# Patient Record
Sex: MALE | Race: BLACK OR AFRICAN AMERICAN | NOT HISPANIC OR LATINO | Employment: OTHER | URBAN - METROPOLITAN AREA
[De-identification: names, ages, dates, MRNs, and addresses within clinical notes are randomized per-mention and may not be internally consistent; named-entity substitution may affect disease eponyms.]

---

## 2022-12-20 ENCOUNTER — APPOINTMENT (OUTPATIENT)
Dept: GENERAL RADIOLOGY | Facility: HOSPITAL | Age: 32
DRG: 871 | End: 2022-12-20
Payer: MEDICARE

## 2022-12-20 PROCEDURE — 99285 EMERGENCY DEPT VISIT HI MDM: CPT

## 2022-12-20 PROCEDURE — U0004 COV-19 TEST NON-CDC HGH THRU: HCPCS | Performed by: EMERGENCY MEDICINE

## 2022-12-20 PROCEDURE — 87150 DNA/RNA AMPLIFIED PROBE: CPT | Performed by: EMERGENCY MEDICINE

## 2022-12-20 PROCEDURE — 36415 COLL VENOUS BLD VENIPUNCTURE: CPT | Performed by: EMERGENCY MEDICINE

## 2022-12-20 PROCEDURE — 87147 CULTURE TYPE IMMUNOLOGIC: CPT | Performed by: EMERGENCY MEDICINE

## 2022-12-20 PROCEDURE — 88312 SPECIAL STAINS GROUP 1: CPT | Performed by: EMERGENCY MEDICINE

## 2022-12-20 PROCEDURE — 85025 COMPLETE CBC W/AUTO DIFF WBC: CPT | Performed by: EMERGENCY MEDICINE

## 2022-12-20 PROCEDURE — 71046 X-RAY EXAM CHEST 2 VIEWS: CPT

## 2022-12-20 PROCEDURE — 84145 PROCALCITONIN (PCT): CPT | Performed by: EMERGENCY MEDICINE

## 2022-12-20 PROCEDURE — 80053 COMPREHEN METABOLIC PANEL: CPT

## 2022-12-20 PROCEDURE — 83605 ASSAY OF LACTIC ACID: CPT

## 2022-12-20 PROCEDURE — 87186 SC STD MICRODIL/AGAR DIL: CPT | Performed by: EMERGENCY MEDICINE

## 2022-12-20 PROCEDURE — 87804 INFLUENZA ASSAY W/OPTIC: CPT | Performed by: EMERGENCY MEDICINE

## 2022-12-20 RX ORDER — SODIUM CHLORIDE 0.9 % (FLUSH) 0.9 %
10 SYRINGE (ML) INJECTION AS NEEDED
Status: DISCONTINUED | OUTPATIENT
Start: 2022-12-20 | End: 2023-01-01 | Stop reason: HOSPADM

## 2022-12-21 ENCOUNTER — HOSPITAL ENCOUNTER (INPATIENT)
Facility: HOSPITAL | Age: 32
LOS: 11 days | Discharge: HOME | DRG: 871 | End: 2023-01-01
Attending: EMERGENCY MEDICINE | Admitting: INTERNAL MEDICINE
Payer: MEDICARE

## 2022-12-21 ENCOUNTER — APPOINTMENT (OUTPATIENT)
Dept: CARDIOLOGY | Facility: HOSPITAL | Age: 32
DRG: 871 | End: 2022-12-21
Payer: MEDICARE

## 2022-12-21 ENCOUNTER — APPOINTMENT (OUTPATIENT)
Dept: CT IMAGING | Facility: HOSPITAL | Age: 32
DRG: 871 | End: 2022-12-21
Payer: MEDICARE

## 2022-12-21 DIAGNOSIS — N18.6 ESRD (END STAGE RENAL DISEASE): ICD-10-CM

## 2022-12-21 DIAGNOSIS — A41.9 SEPSIS, DUE TO UNSPECIFIED ORGANISM, UNSPECIFIED WHETHER ACUTE ORGAN DYSFUNCTION PRESENT: Primary | ICD-10-CM

## 2022-12-21 DIAGNOSIS — R26.2 DIFFICULTY WALKING: ICD-10-CM

## 2022-12-21 DIAGNOSIS — R50.9 FEVER, UNSPECIFIED FEVER CAUSE: ICD-10-CM

## 2022-12-21 PROBLEM — I31.39 PERICARDIAL EFFUSION: Status: ACTIVE | Noted: 2022-12-21

## 2022-12-21 LAB
ALBUMIN SERPL-MCNC: 4.4 G/DL (ref 3.5–5.2)
ALBUMIN/GLOB SERPL: 1.1 G/DL
ALP SERPL-CCNC: 92 U/L (ref 39–117)
ALT SERPL W P-5'-P-CCNC: 11 U/L (ref 1–41)
ANION GAP SERPL CALCULATED.3IONS-SCNC: 15.3 MMOL/L (ref 5–15)
ANION GAP SERPL CALCULATED.3IONS-SCNC: 16.3 MMOL/L (ref 5–15)
AST SERPL-CCNC: 15 U/L (ref 1–40)
BACTERIA BLD CULT: ABNORMAL
BASOPHILS # BLD AUTO: 0.03 10*3/MM3 (ref 0–0.2)
BASOPHILS # BLD AUTO: 0.04 10*3/MM3 (ref 0–0.2)
BASOPHILS NFR BLD AUTO: 0.2 % (ref 0–1.5)
BASOPHILS NFR BLD AUTO: 0.3 % (ref 0–1.5)
BH CV ECHO MEAS - AO ROOT DIAM: 2.8 CM
BH CV ECHO MEAS - EDV(MOD-SP2): 137 ML
BH CV ECHO MEAS - EDV(MOD-SP4): 142 ML
BH CV ECHO MEAS - EF(MOD-BP): 48 %
BH CV ECHO MEAS - ESV(MOD-SP2): 72 ML
BH CV ECHO MEAS - ESV(MOD-SP4): 73 ML
BH CV ECHO MEAS - IVSD: 1.7 CM
BH CV ECHO MEAS - LA DIMENSION: 5.1 CM
BH CV ECHO MEAS - LAT PEAK E' VEL: 7.4 CM/SEC
BH CV ECHO MEAS - LVIDD: 4.9 CM
BH CV ECHO MEAS - LVIDS: 3.7 CM
BH CV ECHO MEAS - LVOT DIAM: 2 CM
BH CV ECHO MEAS - LVPWD: 2 CM
BH CV ECHO MEAS - MED PEAK E' VEL: 4.79 CM/SEC
BH CV ECHO MEAS - MV A MAX VEL: 53 CM/SEC
BH CV ECHO MEAS - MV DEC TIME: 180 MSEC
BH CV ECHO MEAS - MV E MAX VEL: 118 CM/SEC
BH CV ECHO MEAS - MV E/A: 2.2
BH CV ECHO MEAS - RAP SYSTOLE: 3 MMHG
BH CV ECHO MEAS - RVDD: 2.9 CM
BH CV ECHO MEAS - RVSP: 49 MMHG
BH CV ECHO MEAS - TR MAX PG: 46 MMHG
BH CV ECHO MEAS - TR MAX VEL: 338 CM/SEC
BH CV ECHO MEASUREMENTS AVERAGE E/E' RATIO: 19.36
BILIRUB SERPL-MCNC: 1.1 MG/DL (ref 0–1.2)
BUN SERPL-MCNC: 46 MG/DL (ref 6–20)
BUN SERPL-MCNC: 54 MG/DL (ref 6–20)
BUN/CREAT SERPL: 5.1 (ref 7–25)
BUN/CREAT SERPL: 5.3 (ref 7–25)
CALCIUM SPEC-SCNC: 7.9 MG/DL (ref 8.6–10.5)
CALCIUM SPEC-SCNC: 8.6 MG/DL (ref 8.6–10.5)
CHLORIDE SERPL-SCNC: 90 MMOL/L (ref 98–107)
CHLORIDE SERPL-SCNC: 92 MMOL/L (ref 98–107)
CO2 SERPL-SCNC: 20.7 MMOL/L (ref 22–29)
CO2 SERPL-SCNC: 23.7 MMOL/L (ref 22–29)
CREAT SERPL-MCNC: 10.25 MG/DL (ref 0.76–1.27)
CREAT SERPL-MCNC: 9.07 MG/DL (ref 0.76–1.27)
D-LACTATE SERPL-SCNC: 1.3 MMOL/L (ref 0.5–2)
D-LACTATE SERPL-SCNC: 2.6 MMOL/L (ref 0.5–2)
DEPRECATED RDW RBC AUTO: 63.1 FL (ref 37–54)
DEPRECATED RDW RBC AUTO: 64.3 FL (ref 37–54)
EGFRCR SERPLBLD CKD-EPI 2021: 6.2 ML/MIN/1.73
EGFRCR SERPLBLD CKD-EPI 2021: 7.2 ML/MIN/1.73
EOSINOPHIL # BLD AUTO: 0.01 10*3/MM3 (ref 0–0.4)
EOSINOPHIL # BLD AUTO: 0.01 10*3/MM3 (ref 0–0.4)
EOSINOPHIL NFR BLD AUTO: 0.1 % (ref 0.3–6.2)
EOSINOPHIL NFR BLD AUTO: 0.1 % (ref 0.3–6.2)
ERYTHROCYTE [DISTWIDTH] IN BLOOD BY AUTOMATED COUNT: 18.9 % (ref 12.3–15.4)
ERYTHROCYTE [DISTWIDTH] IN BLOOD BY AUTOMATED COUNT: 19.2 % (ref 12.3–15.4)
FLUAV AG NPH QL: NEGATIVE
FLUBV AG NPH QL IA: NEGATIVE
GLOBULIN UR ELPH-MCNC: 4 GM/DL
GLUCOSE SERPL-MCNC: 117 MG/DL (ref 65–99)
GLUCOSE SERPL-MCNC: 125 MG/DL (ref 65–99)
HCT VFR BLD AUTO: 31 % (ref 37.5–51)
HCT VFR BLD AUTO: 34.2 % (ref 37.5–51)
HGB BLD-MCNC: 10.7 G/DL (ref 13–17.7)
HGB BLD-MCNC: 9.8 G/DL (ref 13–17.7)
HOLD SPECIMEN: NORMAL
HOLD SPECIMEN: NORMAL
IMM GRANULOCYTES # BLD AUTO: 0.15 10*3/MM3 (ref 0–0.05)
IMM GRANULOCYTES # BLD AUTO: 0.22 10*3/MM3 (ref 0–0.05)
IMM GRANULOCYTES NFR BLD AUTO: 1 % (ref 0–0.5)
IMM GRANULOCYTES NFR BLD AUTO: 1.4 % (ref 0–0.5)
IVRT: 85 MSEC
LEFT ATRIUM VOLUME INDEX: 69 ML/M2
LYMPHOCYTES # BLD AUTO: 0.46 10*3/MM3 (ref 0.7–3.1)
LYMPHOCYTES # BLD AUTO: 0.69 10*3/MM3 (ref 0.7–3.1)
LYMPHOCYTES NFR BLD AUTO: 3 % (ref 19.6–45.3)
LYMPHOCYTES NFR BLD AUTO: 4.5 % (ref 19.6–45.3)
MAGNESIUM SERPL-MCNC: 2 MG/DL (ref 1.6–2.6)
MAXIMAL PREDICTED HEART RATE: 187 BPM
MCH RBC QN AUTO: 29.1 PG (ref 26.6–33)
MCH RBC QN AUTO: 29.1 PG (ref 26.6–33)
MCHC RBC AUTO-ENTMCNC: 31.3 G/DL (ref 31.5–35.7)
MCHC RBC AUTO-ENTMCNC: 31.6 G/DL (ref 31.5–35.7)
MCV RBC AUTO: 92 FL (ref 79–97)
MCV RBC AUTO: 92.9 FL (ref 79–97)
MONOCYTES # BLD AUTO: 1.76 10*3/MM3 (ref 0.1–0.9)
MONOCYTES # BLD AUTO: 2.84 10*3/MM3 (ref 0.1–0.9)
MONOCYTES NFR BLD AUTO: 11.6 % (ref 5–12)
MONOCYTES NFR BLD AUTO: 18.4 % (ref 5–12)
NEUTROPHILS NFR BLD AUTO: 11.64 10*3/MM3 (ref 1.7–7)
NEUTROPHILS NFR BLD AUTO: 12.72 10*3/MM3 (ref 1.7–7)
NEUTROPHILS NFR BLD AUTO: 75.4 % (ref 42.7–76)
NEUTROPHILS NFR BLD AUTO: 84 % (ref 42.7–76)
NRBC BLD AUTO-RTO: 0 /100 WBC (ref 0–0.2)
NRBC BLD AUTO-RTO: 0 /100 WBC (ref 0–0.2)
PLATELET # BLD AUTO: 141 10*3/MM3 (ref 140–450)
PLATELET # BLD AUTO: 164 10*3/MM3 (ref 140–450)
PMV BLD AUTO: 10.2 FL (ref 6–12)
PMV BLD AUTO: 9.7 FL (ref 6–12)
POTASSIUM SERPL-SCNC: 4.4 MMOL/L (ref 3.5–5.2)
POTASSIUM SERPL-SCNC: 4.5 MMOL/L (ref 3.5–5.2)
PROCALCITONIN SERPL-MCNC: 62.91 NG/ML (ref 0–0.25)
PROT SERPL-MCNC: 8.4 G/DL (ref 6–8.5)
QT INTERVAL: 386 MS
RBC # BLD AUTO: 3.37 10*6/MM3 (ref 4.14–5.8)
RBC # BLD AUTO: 3.68 10*6/MM3 (ref 4.14–5.8)
SARS-COV-2 RNA PNL SPEC NAA+PROBE: DETECTED
SODIUM SERPL-SCNC: 128 MMOL/L (ref 136–145)
SODIUM SERPL-SCNC: 130 MMOL/L (ref 136–145)
STRESS TARGET HR: 159 BPM
WBC NRBC COR # BLD: 15.14 10*3/MM3 (ref 3.4–10.8)
WBC NRBC COR # BLD: 15.43 10*3/MM3 (ref 3.4–10.8)
WHOLE BLOOD HOLD COAG: NORMAL
WHOLE BLOOD HOLD SPECIMEN: NORMAL

## 2022-12-21 PROCEDURE — 25010000002 VANCOMYCIN 5 G RECONSTITUTED SOLUTION: Performed by: EMERGENCY MEDICINE

## 2022-12-21 PROCEDURE — 80048 BASIC METABOLIC PNL TOTAL CA: CPT | Performed by: STUDENT IN AN ORGANIZED HEALTH CARE EDUCATION/TRAINING PROGRAM

## 2022-12-21 PROCEDURE — 87205 SMEAR GRAM STAIN: CPT | Performed by: STUDENT IN AN ORGANIZED HEALTH CARE EDUCATION/TRAINING PROGRAM

## 2022-12-21 PROCEDURE — 71250 CT THORAX DX C-: CPT

## 2022-12-21 PROCEDURE — 93306 TTE W/DOPPLER COMPLETE: CPT | Performed by: INTERNAL MEDICINE

## 2022-12-21 PROCEDURE — 94799 UNLISTED PULMONARY SVC/PX: CPT

## 2022-12-21 PROCEDURE — 85025 COMPLETE CBC W/AUTO DIFF WBC: CPT | Performed by: STUDENT IN AN ORGANIZED HEALTH CARE EDUCATION/TRAINING PROGRAM

## 2022-12-21 PROCEDURE — 25010000002 CEFEPIME PER 500 MG: Performed by: EMERGENCY MEDICINE

## 2022-12-21 PROCEDURE — 93005 ELECTROCARDIOGRAM TRACING: CPT | Performed by: STUDENT IN AN ORGANIZED HEALTH CARE EDUCATION/TRAINING PROGRAM

## 2022-12-21 PROCEDURE — 99223 1ST HOSP IP/OBS HIGH 75: CPT | Performed by: STUDENT IN AN ORGANIZED HEALTH CARE EDUCATION/TRAINING PROGRAM

## 2022-12-21 PROCEDURE — 99222 1ST HOSP IP/OBS MODERATE 55: CPT | Performed by: INTERNAL MEDICINE

## 2022-12-21 PROCEDURE — 93306 TTE W/DOPPLER COMPLETE: CPT

## 2022-12-21 PROCEDURE — 94660 CPAP INITIATION&MGMT: CPT

## 2022-12-21 PROCEDURE — 83735 ASSAY OF MAGNESIUM: CPT | Performed by: STUDENT IN AN ORGANIZED HEALTH CARE EDUCATION/TRAINING PROGRAM

## 2022-12-21 PROCEDURE — 87070 CULTURE OTHR SPECIMN AEROBIC: CPT | Performed by: STUDENT IN AN ORGANIZED HEALTH CARE EDUCATION/TRAINING PROGRAM

## 2022-12-21 PROCEDURE — 83605 ASSAY OF LACTIC ACID: CPT | Performed by: EMERGENCY MEDICINE

## 2022-12-21 RX ORDER — ACETAMINOPHEN 325 MG/1
975 TABLET ORAL ONCE
Status: DISCONTINUED | OUTPATIENT
Start: 2022-12-21 | End: 2022-12-21

## 2022-12-21 RX ORDER — NIFEDIPINE 20 MG/1
60 CAPSULE ORAL 2 TIMES DAILY
Status: ON HOLD | COMMUNITY
End: 2022-12-21

## 2022-12-21 RX ORDER — SODIUM CHLORIDE 0.9 % (FLUSH) 0.9 %
10 SYRINGE (ML) INJECTION EVERY 12 HOURS SCHEDULED
Status: DISCONTINUED | OUTPATIENT
Start: 2022-12-21 | End: 2023-01-01 | Stop reason: HOSPADM

## 2022-12-21 RX ORDER — ALUMINA, MAGNESIA, AND SIMETHICONE 2400; 2400; 240 MG/30ML; MG/30ML; MG/30ML
15 SUSPENSION ORAL EVERY 6 HOURS PRN
Status: DISCONTINUED | OUTPATIENT
Start: 2022-12-21 | End: 2023-01-01 | Stop reason: HOSPADM

## 2022-12-21 RX ORDER — ACETAMINOPHEN 325 MG/1
650 TABLET ORAL EVERY 6 HOURS PRN
Status: DISCONTINUED | OUTPATIENT
Start: 2022-12-21 | End: 2023-01-01 | Stop reason: HOSPADM

## 2022-12-21 RX ORDER — ERGOCALCIFEROL 1.25 MG/1
50000 CAPSULE ORAL WEEKLY
COMMUNITY
End: 2023-01-01 | Stop reason: HOSPADM

## 2022-12-21 RX ORDER — LOSARTAN POTASSIUM 100 MG/1
100 TABLET ORAL NIGHTLY
COMMUNITY

## 2022-12-21 RX ORDER — UREA 10 %
1 LOTION (ML) TOPICAL DAILY
COMMUNITY
End: 2023-01-01 | Stop reason: HOSPADM

## 2022-12-21 RX ORDER — CHOLECALCIFEROL (VITAMIN D3) 125 MCG
5 CAPSULE ORAL NIGHTLY PRN
Status: DISCONTINUED | OUTPATIENT
Start: 2022-12-21 | End: 2023-01-01 | Stop reason: HOSPADM

## 2022-12-21 RX ORDER — FOLIC ACID/VIT B COMPLEX AND C 0.8 MG
1 TABLET ORAL DAILY
COMMUNITY
Start: 2022-10-06

## 2022-12-21 RX ORDER — SODIUM CHLORIDE 9 MG/ML
40 INJECTION, SOLUTION INTRAVENOUS AS NEEDED
Status: DISCONTINUED | OUTPATIENT
Start: 2022-12-21 | End: 2023-01-01 | Stop reason: HOSPADM

## 2022-12-21 RX ORDER — NIFEDIPINE 60 MG/1
60 TABLET, EXTENDED RELEASE ORAL DAILY
COMMUNITY

## 2022-12-21 RX ORDER — NITROGLYCERIN 0.4 MG/1
0.4 TABLET SUBLINGUAL
Status: DISCONTINUED | OUTPATIENT
Start: 2022-12-21 | End: 2023-01-01 | Stop reason: HOSPADM

## 2022-12-21 RX ORDER — CEFEPIME 1 G/50ML
2 INJECTION, SOLUTION INTRAVENOUS ONCE
Status: COMPLETED | OUTPATIENT
Start: 2022-12-21 | End: 2022-12-21

## 2022-12-21 RX ORDER — CALCIUM ACETATE 667 MG/1
667 CAPSULE ORAL 3 TIMES DAILY
COMMUNITY
Start: 2022-10-06 | End: 2023-01-01 | Stop reason: HOSPADM

## 2022-12-21 RX ORDER — CARVEDILOL 12.5 MG/1
12.5 TABLET ORAL EVERY 12 HOURS SCHEDULED
Status: DISCONTINUED | OUTPATIENT
Start: 2022-12-21 | End: 2022-12-21

## 2022-12-21 RX ORDER — CEFEPIME 1 G/50ML
2 INJECTION, SOLUTION INTRAVENOUS EVERY 24 HOURS
Status: DISCONTINUED | OUTPATIENT
Start: 2022-12-22 | End: 2022-12-23

## 2022-12-21 RX ORDER — ACETAMINOPHEN 325 MG/1
975 TABLET ORAL ONCE
Status: COMPLETED | OUTPATIENT
Start: 2022-12-21 | End: 2022-12-21

## 2022-12-21 RX ORDER — HEPARIN SODIUM 5000 [USP'U]/ML
5000 INJECTION, SOLUTION INTRAVENOUS; SUBCUTANEOUS EVERY 12 HOURS SCHEDULED
Status: DISCONTINUED | OUTPATIENT
Start: 2022-12-21 | End: 2022-12-21

## 2022-12-21 RX ORDER — SODIUM CHLORIDE 0.9 % (FLUSH) 0.9 %
10 SYRINGE (ML) INJECTION AS NEEDED
Status: DISCONTINUED | OUTPATIENT
Start: 2022-12-21 | End: 2023-01-01 | Stop reason: HOSPADM

## 2022-12-21 RX ADMIN — ACETAMINOPHEN 650 MG: 325 TABLET ORAL at 09:45

## 2022-12-21 RX ADMIN — CEFEPIME 2 G: 1 INJECTION, SOLUTION INTRAVENOUS at 02:10

## 2022-12-21 RX ADMIN — ALUMINUM HYDROXIDE, MAGNESIUM HYDROXIDE, AND DIMETHICONE 15 ML: 400; 400; 40 SUSPENSION ORAL at 16:40

## 2022-12-21 RX ADMIN — Medication 10 ML: at 08:50

## 2022-12-21 RX ADMIN — SODIUM CHLORIDE 250 ML: 9 INJECTION, SOLUTION INTRAVENOUS at 03:51

## 2022-12-21 RX ADMIN — Medication 10 ML: at 21:46

## 2022-12-21 RX ADMIN — VANCOMYCIN HYDROCHLORIDE 1000 MG: 5 INJECTION, POWDER, LYOPHILIZED, FOR SOLUTION INTRAVENOUS at 02:42

## 2022-12-21 RX ADMIN — ACETAMINOPHEN 975 MG: 325 TABLET ORAL at 01:37

## 2022-12-21 NOTE — PROGRESS NOTES
"[facility] Clinical Pharmacy Services: Vancomycin Pharmacokinetic Initial Consult Note    Ghassan Browning is a 32 y.o. male who is on day 1 of pharmacy to dose vancomycin for Sepsis.    Consulting Provider: Piedad  Planned Duration of Therapy: 7 days (through ) per consult  Was Patient Receiving Prior to Admission/Consult?: No  Loading Dose Ordered or Given: 1000 mg on  at 0242  PK/PD Target: Dose by Levels  Imaging Reviewed?: Yes  Other Antimicrobials: Cefepime    Microbiology Data  MRSA PCR performed: No; Result: Not ordered due to excluded indication or presence of suspected abscess  Culture/Source:    Blood cx: in process    Vitals/Labs  Ht: 162.6 cm (64\"); Wt: 52.2 kg (115 lb 1.3 oz)  Temp (24hrs), Av.4 °F (38 °C), Min:98.8 °F (37.1 °C), Max:103.1 °F (39.5 °C)   Estimated Creatinine Clearance: 7.6 mL/min (A) (by C-G formula based on SCr of 10.25 mg/dL (H)).  Hemodialysis - [facility] schedule     Results from last 7 days   Lab Units 22  0524 22  2354   CREATININE mg/dL 10.25* 9.07*   WBC 10*3/mm3 15.43* 15.14*     Assessment/Plan:    Vancomycin Dose: Will continue to pulse dose at this time due to HD. Last HD session .  Will continue to follow for planned HD schedule (usual schedule [facility]).  Loading dose received this morning @0242.  Vanc Random ordered for  at AM labs  Patient has order for Basic Metabolic Panel  AM labs.    Pharmacy will follow patient's kidney function and will adjust doses and obtain levels as necessary. Thank you for involving pharmacy in this patient's care. Please contact pharmacy with any questions or concerns.                           Ammy Oneal [facility]  Clinical Pharmacist    "

## 2022-12-21 NOTE — PLAN OF CARE
Goal Outcome Evaluation:   Pt refusing to wear bipap while sleeping. Tylenol given for body aches. VSS.        Progress: no change

## 2022-12-21 NOTE — NURSING NOTE
Patient arrived from ER.  Admission completed.  Patient very sleepy upon arrival.  O2 95% on RA.  Tachypnea. Patient shown how to use call light, educated on falls, bed to lowest position, bedside table within reach, bed locked.  No further questions or concerns from patient at this time.

## 2022-12-21 NOTE — CONSULTS
Cardiology Consult Note  [facility] CARE UNIT          Patient Identification:  Ghassan Browning      [id number]  32 y.o.        male  1990       Date of Consultation:     Reason for Consultation: Pericardial effusion    PCP: Paulina Cullen MD  Primary cardiologist: None    History of Present Illness:     32-year-old -American male, end-stage renal disease, chronic hemodialysis, chart history of SVC syndrome with previous SVC stenting, hypertension.  The patient is admitted with fever, chills, sepsis, productive cough.  As part of his initial work-up he had a CT chest which showed large pericardial effusion and bilateral infiltrates.  Subsequent echo done this morning showed a large circumferential pericardial effusion, appears to be fluid, there is no evidence of tamponade or right ventricular compression.  The patient reports pain all over.  He denies chest pain.  He missed 1 dialysis session due to this illness.  He is otherwise somnolent and appears somewhat confused at baseline.    Past History:  Past Medical History:   Diagnosis Date   • Bronchitis    • Chronic pain    • Contusion, hip    • ESRD on dialysis (HCC)    • Headache    • History of brain shunt    • Hypertension    • Kidney disease    • Sleep apnea    • Wrist sprain      Past Surgical History:   Procedure Laterality Date   • CSF SHUNT Right      Allergies   Allergen Reactions   • Methylene Blue Anaphylaxis and Hives     Throat closes/ swelling    • Ace Inhibitors    • Latex    • Penicillins      Social History     Socioeconomic History   • Marital status: Single   Tobacco Use   • Smoking status: Former     Packs/day: 0.50     Types: Cigarettes     Quit date: 2022     Years since quittin.0   Vaping Use   • Vaping Use: Never used   Substance and Sexual Activity   • Alcohol use: Never   • Drug use: Never   • Sexual activity: Defer     History reviewed. No pertinent family  "history.  Medications:  Medications Prior to Admission   Medication Sig Dispense Refill Last Dose   • Calcium Acetate, Phos Binder, 667 MG capsule Take 3 capsules by mouth. With each meal   12/20/2022   • carvedilol (COREG) 12.5 MG tablet Take 1 tablet by mouth 2 (two) times a day.   12/20/2022   • cinacalcet (SENSIPAR) 60 MG tablet Take 1 tablet by mouth 2 (two) times a day.   12/20/2022   • LOSARTAN POTASSIUM PO Take 100 mg by mouth Every Night.   12/20/2022   • NIFEdipine (PROCARDIA) 20 MG capsule Take 60 mg by mouth 2 (Two) Times a Day.   12/20/2022     Current medications:  [START ON 12/22/2022] cefepime, 2 g, Intravenous, Q24H  sodium chloride, 10 mL, Intravenous, Q12H      Current IV drips:  Pharmacy to Dose Cefepime,   Pharmacy to dose vancomycin,         Review of Systems   Unable to perform ROS: mental status change         Physical exam:    BP 98/52 (BP Location: Left arm, Patient Position: Lying)   Pulse 83   Temp 97.6 °F (36.4 °C) (Oral)   Resp 24   Ht 162.6 cm (64\")   Wt 52.2 kg (115 lb 1.3 oz)   SpO2 96%   BMI 19.75 kg/m²  Body mass index is 19.75 kg/m².   Oxygen saturation   @FLOWAN(10::1)@ SpO2  Min: 90 %  Max: 100 %    General Appearance:   · well developed  · Frail, chronically ill-appearing  HENT:   · oropharynx moist  · lips not cyanotic  Neck:  · thyroid not enlarged  · supple  Respiratory:  · no respiratory distress  · normal breath sounds  · no rales  Cardiovascular:  · no jugular venous distention  · regular rhythm  · apical impulse normal  · S1 normal, S2 normal  · no S3, no S4   · no murmur  · no rub, no thrill  · carotid pulses normal; no bruit  · pedal pulses normal  · lower extremity edema: Trace  Gastrointestinal:   · bowel sounds normal  · non-tender  · no hepatomegaly, no splenomegaly  Musculoskeletal:  · no clubbing of fingers.   · normocephalic, head atraumatic  Skin:   · warm, dry  Neuro/Psychiatric:  · Somnolent, confused at baseline drifts to " sleep  ·     Cardiographics:     ECG  (personally reviewed) sinus rhythm, rate 91, nonspecific ST changes   Telemetry:  (personally reviewed)    ECHO: Reviewed  Results for orders placed during the hospital encounter of 12/21/22    Adult Transthoracic Echo Complete W/ Cont if Necessary Per Protocol    Interpretation Summary  •  Left ventricular ejection fraction appears to be 46 - 50%.  •  The left ventricular cavity is mildly dilated.  •  Left ventricular wall thickness is consistent with moderate to severe concentric hypertrophy.  •  Left ventricular diastolic function is consistent with (grade II w/high LAP) pseudonormalization.  •  Moderately reduced right ventricular systolic function noted.  •  The right ventricular cavity is mildly dilated.  •  Left atrial volume is moderately increased.  •  Moderate tricuspid valve regurgitation is present.  •  Estimated right ventricular systolic pressure from tricuspid regurgitation is moderately elevated (45-55 mmHg).  •  There is a large (>2cm) circumferential pericardial effusion.  •  There is no evidence of cardiac tamponade.       CATH:     CARDIOLITE:      Lab Review:       Results from last 7 days   Lab Units 12/21/22  0524 12/20/22  2354   WBC 10*3/mm3 15.43* 15.14*   HEMOGLOBIN g/dL 9.8* 10.7*   HEMATOCRIT % 31.0* 34.2*            Results from last 7 days   Lab Units 12/21/22  0524 12/20/22  2354   SODIUM mmol/L 128* 130*   BUN mg/dL 54* 46*   CREATININE mg/dL 10.25* 9.07*   GLUCOSE mg/dL 117* 125*      Estimated Creatinine Clearance: 7.6 mL/min (A) (by C-G formula based on SCr of 10.25 mg/dL (H)).         Invalid input(s): LDLCALC        No results found for: TSH   No results found for: HGBA1C        No results found for: DIGOXIN   No components found for: DDIMERQUAN     Imaging:   XR Chest 2 View    Result Date: 12/21/2022  PROCEDURE: XR CHEST 2 VW  COMPARISON: None.  INDICATIONS: 32-year-old male with fever, cough, shortness of breath.  FINDINGS:  Two views  were obtained.  Moderate to marked cardiomegaly is seen.  An endovascular stent is projected over the right paratracheal region.  There is a partially visualized ventriculoperitoneal () shunt (distal limb) coursing over the right base of neck, right thorax, and right upper quadrant of the abdomen.  Surgical clips are projected over the base of the neck and the superior mediastinum.  There is suspected scoliosis.  There are bilateral infiltrates.  Mild pulmonary edema is possible.  Infectious multifocal pneumonia would be in the differential diagnosis.  Minimal, if any, pleural effusion is seen.  No pneumothorax.  No pneumomediastinum.  There is slight asymmetry of the soft tissues at the level of the base of the neck with those on the right more prominent than seen contralaterally.  The finding may be related to the patient's scoliosis.  Please correlate clinically.      Impression:   Bilateral infiltrates are seen.  The findings may represent mild pulmonary edema with vascular congestion.  Pneumonia cannot be excluded.  There is moderate to marked cardiomegaly.  Please see above comments for further detail.     Please note that portions of this note were completed with a voice recognition program.  JOHNATHON ALVAREZ JR, MD       Electronically Signed and Approved By: JOHNATHON ALVAREZ JR, MD on 12/21/2022 at 1:59                  The ASCVD Risk score ([place] DK, et al., 2019) failed to calculate for the following reasons:    The 2019 ASCVD risk score is only valid for ages 40 to 79      Assessment:      Sepsis, due to unspecified organism, unspecified whether acute organ dysfunction present (HCC)    Pericardial effusion    ESRD (end stage renal disease) (HCC)      Initial cardiac assessment: 32-year-old -American male with multiple medical problems presents with fever, chills, productive cough, clinical evaluation consistent with sepsis.  CT chest showed large pericardial effusion which is verified on echo but  no evidence of tamponade      Recommendations:  1.  At this time primary treatment of sepsis per primary service, and reviewing his echocardiogram and clinical/hemodynamic parameters at this time there is no evidence of tamponade or an indication to sample or drain his pericardial effusion.  2.  Volume management per nephrology with dialysis as needed/indicated  3.  Call if additional questions arise                    Andry May MD  12/21/2022    12:09 EST

## 2022-12-21 NOTE — ED PROVIDER NOTES
Time: 1:51 AM EST  Chief Complaint:   Chief Complaint   Patient presents with   • Fever   • Generalized Body Aches   • Dizziness     History of Present Illness:    Patient is a 32 y.o. year old male who presents to the emergency department with complaints of fever, body aches, headache, productive cough (yellowish), congestion that began 2 days ago. Pt reports nausea, but denies vomiting. Pt denies any known sick contacts. Patient is a HD patient. Pt went to dialysis yesterday. Patient feels somewhat short of breath. Pt denies chest pain.       History provided by:  Patient   used: No            Patient Care Team  Primary Care Provider: Paulina Cullen MD    Past Medical History:     Allergies   Allergen Reactions   • Methylene Blue Anaphylaxis and Hives     Throat closes/ swelling    • Ace Inhibitors    • Latex    • Penicillins      Past Medical History:   Diagnosis Date   • Bronchitis    • Chronic pain    • Contusion, hip    • ESRD on dialysis (HCC)    • Headache    • History of brain shunt    • Hypertension    • Kidney disease    • Sleep apnea    • Wrist sprain      Past Surgical History:   Procedure Laterality Date   • CSF SHUNT Right      History reviewed. No pertinent family history.    Home Medications:  Prior to Admission medications    Medication Sig Start Date End Date Taking? Authorizing Provider   Calcium Acetate, Phos Binder, 667 MG capsule Take 3 capsules by mouth. With each meal 14   Elsi Morillo MD   carvedilol (COREG) 12.5 MG tablet Take 1 tablet by mouth 2 (two) times a day. 14   Elsi Morillo MD   cinacalcet (SENSIPAR) 60 MG tablet Take 1 tablet by mouth 2 (two) times a day. 14   Elsi Morillo MD        Social History:   Social History     Tobacco Use   • Smoking status: Former     Packs/day: 0.50     Types: Cigarettes     Quit date: 2022     Years since quittin.0   Vaping Use   • Vaping Use: Never used   Substance Use Topics   •  "Alcohol use: Never   • Drug use: Never         Review of Systems:  Review of Systems   Constitutional: Positive for fever. Negative for chills.   HENT: Positive for congestion. Negative for rhinorrhea and sore throat.    Eyes: Negative for pain and visual disturbance.   Respiratory: Positive for cough and shortness of breath. Negative for apnea and chest tightness.    Cardiovascular: Negative for chest pain and palpitations.   Gastrointestinal: Positive for nausea. Negative for abdominal pain, diarrhea and vomiting.   Genitourinary: Negative for difficulty urinating and dysuria.   Musculoskeletal: Positive for myalgias. Negative for joint swelling.   Skin: Negative for color change.   Neurological: Positive for headaches. Negative for seizures.   Psychiatric/Behavioral: Negative.    All other systems reviewed and are negative.       Physical Exam:  /70 (BP Location: Left arm, Patient Position: Lying)   Pulse 87   Temp 98.8 °F (37.1 °C) (Oral)   Resp 22   Ht 162.6 cm (64\")   Wt 52.2 kg (115 lb 1.3 oz)   SpO2 95%   BMI 19.75 kg/m²     Physical Exam  Vitals and nursing note reviewed.   Constitutional:       General: He is not in acute distress.     Appearance: Normal appearance. He is ill-appearing. He is not toxic-appearing.   HENT:      Head: Normocephalic and atraumatic.      Jaw: There is normal jaw occlusion.   Eyes:      General: Lids are normal.      Extraocular Movements: Extraocular movements intact.      Conjunctiva/sclera: Conjunctivae normal.      Pupils: Pupils are equal, round, and reactive to light.   Cardiovascular:      Rate and Rhythm: Regular rhythm. Tachycardia present.      Pulses: Normal pulses.      Heart sounds: Normal heart sounds.   Pulmonary:      Effort: Pulmonary effort is normal. No respiratory distress.      Breath sounds: Normal breath sounds. No wheezing or rhonchi.   Abdominal:      General: Abdomen is flat.      Palpations: Abdomen is soft.      Tenderness: There is no " abdominal tenderness. There is no guarding or rebound.   Musculoskeletal:         General: Normal range of motion.      Cervical back: Normal range of motion and neck supple.      Right lower leg: No edema.      Left lower leg: No edema.      Comments: RUE AV fistula   Skin:     General: Skin is warm and dry.   Neurological:      Mental Status: He is alert and oriented to person, place, and time. Mental status is at baseline.   Psychiatric:         Mood and Affect: Mood normal.                Medications in the Emergency Department:  Medications   sodium chloride 0.9 % flush 10 mL (has no administration in time range)   nitroglycerin (NITROSTAT) SL tablet 0.4 mg (has no administration in time range)   sodium chloride 0.9 % flush 10 mL (has no administration in time range)   sodium chloride 0.9 % flush 10 mL (has no administration in time range)   sodium chloride 0.9 % infusion 40 mL (has no administration in time range)   acetaminophen (TYLENOL) tablet 650 mg (has no administration in time range)   aluminum-magnesium hydroxide-simethicone (MAALOX MAX) 400-400-40 MG/5ML suspension 15 mL (has no administration in time range)   melatonin tablet 5 mg (has no administration in time range)   Pharmacy to Dose Cefepime (has no administration in time range)   Pharmacy to dose vancomycin (has no administration in time range)   cefepime (MAXIPIME) IVPB 2 g (premix) in D5 (has no administration in time range)   acetaminophen (TYLENOL) tablet 975 mg (975 mg Oral Given 12/21/22 0137)   cefepime (MAXIPIME) IVPB 2 g (premix) in D5 (0 g Intravenous Stopped 12/21/22 0242)   vancomycin 1000 mg/250 mL 0.9% NS IVPB ([facility]) (0 mg Intravenous Stopped 12/21/22 0348)   sodium chloride 0.9 % bolus 250 mL (0 mL Intravenous Stopped 12/21/22 9484)        Labs  Lab Results (last 24 hours)     Procedure Component Value Units Date/Time    Influenza Antigen, Rapid - Swab, Nasopharynx [[id number]]  (Normal) Collected: 12/20/22 0778    Specimen: Swab  from Nasopharynx Updated: 12/21/22 0025     Influenza A Ag, EIA Negative     Influenza B Ag, EIA Negative    COVID-19,APTIMA PANTHER(F[facility]IAN),[facility], NP/OP SWAB IN UTM/VTM/SALINE TRANSPORT MEDIA,24 HR TAT - Swab, Nasal Cavity [[id number]] Collected: 12/20/22 2339    Specimen: Swab from Nasal Cavity Updated: 12/21/22 0000    CBC & Differential [[id number]]  (Abnormal) Collected: 12/20/22 2354    Specimen: Blood Updated: 12/21/22 0005    Narrative:      The following orders were created for panel order CBC & Differential.  Procedure                               Abnormality         Status                     ---------                               -----------         ------                     CBC Auto Differential[[id number]]        Abnormal            Final result                 Please view results for these tests on the individual orders.    Comprehensive Metabolic Panel [[id number]]  (Abnormal) Collected: 12/20/22 2354    Specimen: Blood Updated: 12/21/22 0026     Glucose 125 mg/dL      BUN 46 mg/dL      Creatinine 9.07 mg/dL      Sodium 130 mmol/L      Potassium 4.5 mmol/L      Chloride 90 mmol/L      CO2 23.7 mmol/L      Calcium 8.6 mg/dL      Total Protein 8.4 g/dL      Albumin 4.40 g/dL      ALT (SGPT) 11 U/L      AST (SGOT) 15 U/L      Alkaline Phosphatase 92 U/L      Total Bilirubin 1.1 mg/dL      Globulin 4.0 gm/dL      A/G Ratio 1.1 g/dL      BUN/Creatinine Ratio 5.1     Anion Gap 16.3 mmol/L      eGFR 7.2 mL/min/1.73      Comment: <15 Indicative of kidney failure       Narrative:      GFR Normal >60  Chronic Kidney Disease <60  Kidney Failure <15      Lactic Acid, Plasma [[id number]]  (Abnormal) Collected: 12/20/22 2354    Specimen: Blood Updated: 12/21/22 0104     Lactate 2.6 mmol/L     Blood Culture - Blood, Hand, Left [[id number]] Collected: 12/20/22 2354    Specimen: Blood from Hand, Left Updated: 12/21/22 0001    Blood Culture - Blood, Hand, Left [[id number]] Collected: 12/20/22 2354    Specimen: Blood  "from Hand, Left Updated: 12/21/22 0001    CBC Auto Differential [[id number]]  (Abnormal) Collected: 12/20/22 2354    Specimen: Blood Updated: 12/21/22 0005     WBC 15.14 10*3/mm3      RBC 3.68 10*6/mm3      Hemoglobin 10.7 g/dL      Hematocrit 34.2 %      MCV 92.9 fL      MCH 29.1 pg      MCHC 31.3 g/dL      RDW 19.2 %      RDW-SD 64.3 fl      MPV 10.2 fL      Platelets 164 10*3/mm3      Neutrophil % 84.0 %      Lymphocyte % 3.0 %      Monocyte % 11.6 %      Eosinophil % 0.1 %      Basophil % 0.3 %      Immature Grans % 1.0 %      Neutrophils, Absolute 12.72 10*3/mm3      Lymphocytes, Absolute 0.46 10*3/mm3      Monocytes, Absolute 1.76 10*3/mm3      Eosinophils, Absolute 0.01 10*3/mm3      Basophils, Absolute 0.04 10*3/mm3      Immature Grans, Absolute 0.15 10*3/mm3      nRBC 0.0 /100 WBC     Procalcitonin [[id number]]  (Abnormal) Collected: 12/20/22 2354    Specimen: Blood Updated: 12/21/22 0257     Procalcitonin 62.91 ng/mL     Narrative:      As a Marker for Sepsis (Non-Neonates):    1. <0.5 ng/mL represents a low risk of severe sepsis and/or septic shock.  2. >2 ng/mL represents a high risk of severe sepsis and/or septic shock.    As a Marker for Lower Respiratory Tract Infections that require antibiotic therapy:    PCT on Admission    Antibiotic Therapy       6-12 Hrs later    >0.5                Strongly Recommended  >0.25 - <0.5        Recommended  0.1 - 0.25          Discouraged              Remeasure/reassess PCT  <0.1                Strongly Discouraged     Remeasure/reassess PCT    As 28 day mortality risk marker: \"Change in Procalcitonin Result\" (>80% or <=80%) if Day 0 (or Day 1) and Day 4 values are available. Refer to http://www.[facility]-pct-calculator.com    Change in PCT <=80%  A decrease of PCT levels below or equal to 80% defines a positive change in PCT test result representing a higher risk for 28-day all-cause mortality of patients diagnosed with severe sepsis for septic shock.    Change in PCT " >80%  A decrease of PCT levels of more than 80% defines a negative change in PCT result representing a lower risk for 28-day all-cause mortality of patients diagnosed with severe sepsis or septic shock.    This test is Prognostic not Diagnostic, if elevated correlate with clinical findings before administering antibiotic treatment.        STAT Lactic Acid, Reflex [[id number]]  (Normal) Collected: 12/21/22 0524    Specimen: Blood Updated: 12/21/22 0554     Lactate 1.3 mmol/L     Basic Metabolic Panel [[id number]]  (Abnormal) Collected: 12/21/22 0524    Specimen: Blood Updated: 12/21/22 0558     Glucose 117 mg/dL      BUN 54 mg/dL      Creatinine 10.25 mg/dL      Sodium 128 mmol/L      Potassium 4.4 mmol/L      Chloride 92 mmol/L      CO2 20.7 mmol/L      Calcium 7.9 mg/dL      BUN/Creatinine Ratio 5.3     Anion Gap 15.3 mmol/L      eGFR 6.2 mL/min/1.73      Comment: <15 Indicative of kidney failure       Narrative:      GFR Normal >60  Chronic Kidney Disease <60  Kidney Failure <15      CBC & Differential [[id number]]  (Abnormal) Collected: 12/21/22 0524    Specimen: Blood Updated: 12/21/22 0544    Narrative:      The following orders were created for panel order CBC & Differential.  Procedure                               Abnormality         Status                     ---------                               -----------         ------                     CBC Auto Differential[[id number]]        Abnormal            Final result               Scan Slide[[id number]]                                                                    Please view results for these tests on the individual orders.    Magnesium [[id number]]  (Normal) Collected: 12/21/22 0524    Specimen: Blood Updated: 12/21/22 0558     Magnesium 2.0 mg/dL     CBC Auto Differential [[id number]]  (Abnormal) Collected: 12/21/22 0524    Specimen: Blood Updated: 12/21/22 0544     WBC 15.43 10*3/mm3      RBC 3.37 10*6/mm3      Hemoglobin 9.8 g/dL      Hematocrit 31.0 %       MCV 92.0 fL      MCH 29.1 pg      MCHC 31.6 g/dL      RDW 18.9 %      RDW-SD 63.1 fl      MPV 9.7 fL      Platelets 141 10*3/mm3      Neutrophil % 75.4 %      Lymphocyte % 4.5 %      Monocyte % 18.4 %      Eosinophil % 0.1 %      Basophil % 0.2 %      Immature Grans % 1.4 %      Neutrophils, Absolute 11.64 10*3/mm3      Lymphocytes, Absolute 0.69 10*3/mm3      Monocytes, Absolute 2.84 10*3/mm3      Eosinophils, Absolute 0.01 10*3/mm3      Basophils, Absolute 0.03 10*3/mm3      Immature Grans, Absolute 0.22 10*3/mm3      nRBC 0.0 /100 WBC            Imaging:  XR Chest 2 View    Result Date: 12/21/2022  PROCEDURE: XR CHEST 2 VW  COMPARISON: None.  INDICATIONS: 32-year-old male with fever, cough, shortness of breath.  FINDINGS:  Two views were obtained.  Moderate to marked cardiomegaly is seen.  An endovascular stent is projected over the right paratracheal region.  There is a partially visualized ventriculoperitoneal () shunt (distal limb) coursing over the right base of neck, right thorax, and right upper quadrant of the abdomen.  Surgical clips are projected over the base of the neck and the superior mediastinum.  There is suspected scoliosis.  There are bilateral infiltrates.  Mild pulmonary edema is possible.  Infectious multifocal pneumonia would be in the differential diagnosis.  Minimal, if any, pleural effusion is seen.  No pneumothorax.  No pneumomediastinum.  There is slight asymmetry of the soft tissues at the level of the base of the neck with those on the right more prominent than seen contralaterally.  The finding may be related to the patient's scoliosis.  Please correlate clinically.        Bilateral infiltrates are seen.  The findings may represent mild pulmonary edema with vascular congestion.  Pneumonia cannot be excluded.  There is moderate to marked cardiomegaly.  Please see above comments for further detail.     Please note that portions of this note were completed with a voice recognition  program.  JOHNATHON ALVAREZ JR, MD       Electronically Signed and Approved By: JOHNATHON ALVAREZ JR, MD on 12/21/2022 at 1:59              CT Chest Without Contrast Diagnostic    Result Date: 12/21/2022  PROCEDURE: CT CHEST WO CONTRAST DIAGNOSTIC  COMPARISON: None.  INDICATIONS: 32-year-old male w/ pneumonia, complication suspected; chest x-ray done; unspecified congestion; shortness of breath.  TECHNIQUE: 602 CT images were created without the administration of contrast material.   PROTOCOL:   Standard CT imaging protocol performed.    RADIATION:   Total DLP: 215.3 mGy*cm.   Automated exposure control was utilized to minimize radiation dose.  FINDINGS: Mild diffuse crazy-paving pattern of pulmonary opacification is seen, especially in the lung bases.  Diffuse centrilobular pulmonary opacities are also seen.  Differential considerations for the findings are many, such as with ARDS, infectious multifocal pneumonia, inflammatory pneumonitis, and/or pulmonary edema.  COVID-19 pneumonia may give this appearance.  There is moderate-to-marked cardiomegaly.  There is a moderate-to-large pericardial effusion, which measures about 2.7 cm in thickness.  The CT number for the pericardial fluid is 24 Hounsfield units or slightly less.  There may be mild subsegmental atelectasis in the lung bases.  There are enlarged mediastinal and hilar lymph nodes, which are nonspecific.  There are also enlarged lymph nodes involving the base of the neck, supraclavicular regions, axillary regions, partially imaged upper abdomen.  Please correlate clinically, especially with history of malignancy, such as lymphoma.  Endovascular stents are seen in the expected superior vena cava (SVC) and right brachiocephalic vein and probably in the central right subclavian vein.  Minimal if any pleural effusion is seen.  No pneumothorax or pneumomediastinum.  There may be minimal transient intravenous gas, likely iatrogenic in nature.  The partially imaged  kidneys are atrophic with diffuse cortical scarring.  There is motion artifact on the exam.  There may be borderline hepatomegaly.  Probably no splenomegaly.  There is suspected dextroscoliosis of the thoracic spine.  No definite acute fracture or aggressive osseous lesion is appreciated.  There may be anasarca.        1. Bilateral infiltrates are seen, which are nonspecific.  Infectious multifocal pneumonia would be in the differential diagnosis.  2. There is moderate-to-marked cardiomegaly.  3. There is a moderate-to-large pericardial effusion.  4. Extensive adenopathy is seen, as discussed.  Please correlate clinically.  For instance, does the patient have a history of lymphoma or other malignancies?  5. No pneumothorax or pneumomediastinum is suggested.  6. Please see above comments for further detail.   1.   Please note that portions of this note were completed with a voice recognition program.  JOHNATHON ALVAREZ JR, MD       Electronically Signed and Approved By: JOHNATHON ALVAREZ JR, MD on 12/21/2022 at 4:54                Procedures:  Procedures    Progress  ED Course as of 12/21/22 0651   Tue Dec 20, 2022   [id number] The patient was seen and examined by Ivis danielson APRN, while in triage. Orders placed. Patient is awaiting disposition.   [AR]      ED Course User Index  [AR] Ivis Mahajan APRN                            The patient was initially evaluated in the triage area where orders were placed. The patient was later dispositioned by Xavier Ratliff MD.      The patient was advised to stay for completion of workup which includes but is not limited to communication of labs and radiological results, reassessment and plan. The patient was advised that leaving prior to disposition by a provider could result in critical findings that are not communicated to the patient.     Medical Decision Making:  MDM  Number of Diagnoses or Management Options  ESRD (end stage renal disease) (HCC)  Fever, unspecified fever  cause  Sepsis, due to unspecified organism, unspecified whether acute organ dysfunction present (HCC)  Diagnosis management comments: Sepsis criteria was met in the emergency department and the Sepsis protocol (including antibiotic administration) was initiated.      SIRS criteria considered:   1.  Temperature > 100.4 or <98.6    2.  Heart Rate > 90    3.  Respiratory Rate > 22    4.  WBC > 12K or <4K.             Severe Sepsis:     Respiratory: Mechanical Ventilation or Bipap  Hypotension: SBP > 90 or MAP < 65  Renal: Creatinine > 2  Metabolic: Lactic Acid > 2  Hematologic: Platelets < 100K or INR > 1.5  Hepatic: BILI  >  2  CNS: Sudden AMS     Septic Shock:     Severe Sepsis + Persistent hypotension or Lactic Acid > 4     Normal saline bolus, Antibiotics, and final disposition was based on these definitions.        Sepsis was recognized at 0144    Antibiotics were ordered.       30 cc/kg bolus was not indicated.       Patient did not receive the recommended 30ml/kg fluid bolus for sepsis due to renal failure.    The patient has Renal Failure.       Total Critical Care time of 35 minutes. Total critical care time documented does not include time spent on separately billed procedures for services of nurses or physician assistants. I personally saw and examined the patient. I have reviewed all diagnostic interpretations and treatment plans as written. I was present for the key portions of any procedures performed and the inclusive time noted in any critical care statement. Critical care time includes patient management by me, time spent at the patients bedside,  time to review lab and imaging results, discussing patient care, documentation in the medical record, and time spent with family or caregiver.         Amount and/or Complexity of Data Reviewed  Clinical lab tests: ordered and reviewed  Tests in the radiology section of CPT®: ordered and reviewed  Tests in the medicine section of CPT®: reviewed and  ordered  Decide to obtain previous medical records or to obtain history from someone other than the patient: yes  Discuss the patient with other providers: yes  Independent visualization of images, tracings, or specimens: yes             The following orders were placed after triage and evaluation:  Orders Placed This Encounter   Procedures   • Blood Culture - Blood,   • Blood Culture - Blood,   • Influenza Antigen, Rapid - Swab, Nasopharynx   • COVID-19,APTIMA PANTHER(F[facility]),[facility], NP/OP SWAB IN UTM/VTM/SALINE TRANSPORT MEDIA,24 HR TAT - Swab, Nasal Cavity   • Respiratory Culture - Sputum, Cough   • XR Chest 2 View   • CT Chest Without Contrast Diagnostic   • Comprehensive Metabolic Panel   • Lactic Acid, Plasma   • Laura Draw   • CBC Auto Differential   • STAT Lactic Acid, Reflex   • Procalcitonin   • Potassium   • Magnesium   • Troponin   • Blood Gas, Arterial -With Co-Ox Panel: Yes   • Basic Metabolic Panel   • Magnesium   • CBC Auto Differential   • NPO Diet NPO Type: Strict NPO   • Undress & Gown   • Continuous Pulse Oximetry   • Vital Signs   • Vital Signs   • Continuous Cardiac Monitoring   • Telemetry - Pulse Oximetry   • Notify Provider if ACLS Protocol Activated   • Intake & Output   • Weigh Patient   • Oral Care   • Saline Lock & Maintain IV Access   • Neuro Checks   • Code Status and Medical Interventions:   • Hospitalist (on-call MD unless specified)   • Inpatient Nephrology Consult   • Inpatient Cardiology Consult   • Oxygen Therapy- Nasal Cannula; Titrate for SPO2: 90% - 95%   • ECG 12 Lead Chest Pain   • ECG 12 Lead Chest Pain   • Adult Transthoracic Echo Complete W/ Cont if Necessary Per Protocol   • Insert Peripheral IV   • Insert Peripheral IV   • Inpatient Admission   • CBC & Differential   • Green Top (Gel)   • Lavender Top   • Gold Top - SST   • Light Blue Top   • CBC & Differential       Final diagnoses:   Sepsis, due to unspecified organism, unspecified whether acute organ  dysfunction present (HCC)   ESRD (end stage renal disease) (HCC)   Fever, unspecified fever cause          Disposition:  ED Disposition     ED Disposition   Decision to Admit    Condition   --    Comment   Level of Care: Telemetry [5]   Diagnosis: Sepsis, due to unspecified organism, unspecified whether acute organ dysfunction present ([facility]) [[id number]]   Admitting Physician: ARIES HIGUERA [[id number]]   Attending Physician: ARIES HIGUERA [[id number]]   Isolate for COVID?: No [0]   Certification: I Certify That Inpatient Hospital Services Are Medically Necessary For Greater Than 2 Midnights               This medical record created using voice recognition software.    I, Kelli Rehman, provided documentation assistance for and in the presence of Dr. Ratliff.         Kelli Rehman  12/21/22 0156       Xavier Ratliff MD  12/21/22 0651

## 2022-12-21 NOTE — CONSULTS
[facility]   Nephrology Consult Note      Patient Name: Ghassan Browning  : 1990  MRN: [id number]  Primary Care Physician:  Paulina Cullen MD  Referring Physician: No Known Provider  Date of admission: 2022    Subjective   Subjective     Reason for Consult/ Chief Complaint: End-stage renal disease    HPI:  Ghassan Browning is a 32 y.o. male with history of end-stage renal disease for a long time who was admitted with worsening shortness of breath and fever, some cough as well.  Diagnosed with COVID.  Has known congestive heart failure, SVC syndrome and  shunt.  I was asked to have see him to help manage his dialysis.  He is normally on TTS schedule at [facility] under the care of Dr. Sahni.  Is CT scan of the chest showed bilateral infiltrates and large pericardial effusion, 2D echo showed EF 45 to 50% with severe LVH and large pericardial effusion.  Is already feeling better.  Has been having diarrhea but no nausea or vomiting.    Review of Systems   All systems were reviewed and negative except for: What is mentioned above.    Personal History     Past Medical History:   Diagnosis Date   • Bronchitis    • Chronic pain    • Contusion, hip    • ESRD on dialysis (HCC)    • Headache    • History of brain shunt    • Hypertension    • Kidney disease    • Sleep apnea    • Wrist sprain        Past Surgical History:   Procedure Laterality Date   • CSF SHUNT Right        Family History: family history is not on file. Otherwise pertinent FHx was reviewed and not pertinent to current issue.    Social History:  reports that he quit smoking about 2 weeks ago. His smoking use included cigarettes. He smoked an average of .5 packs per day. He does not have any smokeless tobacco history on file. He reports that he does not drink alcohol and does not use drugs.    Home Medications:  NIFEdipine XL, Marielena-Ismael, calcium acetate, calcium carbonate, carvedilol, ergocalciferol, and  losartan    Allergies:  Allergies   Allergen Reactions   • Methylene Blue Anaphylaxis and Hives     Throat closes/ swelling    • Ace Inhibitors    • Latex    • Penicillins        Objective    Objective     Vitals:   Temp:  [97.6 °F (36.4 °C)-103.1 °F (39.5 °C)] 97.6 °F (36.4 °C)  Heart Rate:  [] 83  Resp:  [22-24] 24  BP: ()/(48-98) 98/52  Flow (L/min):  [2] 2     Physical Exam    Constitutional: Awake, alert, no distress, conversant and pleasant, sitting in bed edge.   Eyes: sclerae anicteric, no conjunctival injection   HENT: mucous membranes moist   Neck: Supple, no thyromegaly, no lymphadenopathy, trachea midline, No JVD   Respiratory: Decreased to auscultation bilaterally, nonlabored respirations    Cardiovascular: RRR, no murmurs, rubs, or gallops.   Gastrointestinal: Positive bowel sounds, soft, nontender, nondistended   Musculoskeletal: No edema, no clubbing or cyanosis, right upper extremity AV graft, patent.   Psychiatric: Appropriate affect, cooperative   Neurologic: Oriented x 3, moving all extremities, Cranial Nerves grossly intact, speech clear   Skin: warm and dry, no rashes     Result Review    Result Reviewed:  I have personally reviewed the results from the time of this admission to 12/21/2022 15:34 EST and agree with these findings:  [x]  Laboratory  []  Microbiology  [x]  Radiology  []  EKG/Telemetry   []  Cardiology/Vascular   []  Pathology  [x]  Old records  []  Other:  Lab Results   Component Value Date    GLUCOSE 117 (H) 12/21/2022    CALCIUM 7.9 (L) 12/21/2022     (L) 12/21/2022    K 4.4 12/21/2022    CO2 20.7 (L) 12/21/2022    CL 92 (L) 12/21/2022    BUN 54 (H) 12/21/2022    CREATININE 10.25 (H) 12/21/2022    BCR 5.3 (L) 12/21/2022    ANIONGAP 15.3 (H) 12/21/2022      Lab Results   Component Value Date    CALCIUM 7.9 (L) 12/21/2022     Results from last 7 days   Lab Units 12/21/22  0524   MAGNESIUM mg/dL 2.0          Most notable findings include: Sodium 128, hemoglobin  9.8.    Assessment & Plan   Assessment / Plan     Brief Patient Summary:  Ghassan Browning is a 32 y.o. male who has history of end-stage renal disease here with COVID, possible pneumonia and shortness of breath.  Already better.    Active Hospital Problems:  Active Hospital Problems    Diagnosis    • **Sepsis, due to unspecified organism, unspecified whether acute organ dysfunction present ([facility])    • Pericardial effusion    • ESRD (end stage renal disease) ([facility])        Assessment and Plan:   - End-stage renal disease, dialysis TTS schedule through right upper extremity AV graft.  We will plan dialysis tomorrow, UF 2 kg as tolerated.  - History of SVC syndrome.  -COVID pneumonia, treatment Per primary, has positive blood cultures.  -Hypertension, with hypotension.  Monitor for now.  - Hyponatremia with hypervolemia, add 1500 cc p.o. fluid restriction and try to corrected with dialysis.  - Metabolic acidosis, mild.  See after dialysis if treatment with sodium bicarb is needed.  - History of  shunt.  - Large pericardial effusion, evaluated by cardiology.  No tamponade.  The above discussed with patient and nursing staff.  Will follow.    Thank you very much for this consult!    Electronically signed by Reyna Torres MD, 12/21/22, 3:34 PM EST.

## 2022-12-21 NOTE — H&P
[facility]  Date: 2022   Patient Name: Ghassan Browning  : 1990  MRN: [id number]  Primary Care Physician:  Paulina Cullen MD  Date of admission: 2022    Subjective   Subjective     Chief Complaint: Fevers, chills, muscle ache    HPI:    Ghassan Browning is a 32 y.o. male past medical history of ESRD on HD , SVC syndrome status post stenting and  shunt placement, hypertension who presented to the ER due to 4-day history of worsening fevers chills nausea and myalgias.  Patient states that he woke up Saturday with significant fevers and chills as well as a productive cough.  This productive cough is worsened over the last few days.  He missed his dialysis session on Saturday but was able to make it yesterday and only complete 3 hours and 45 minutes of a normal 4-hour session.  Patient states this morning he woke up with a higher fever than usual with a 101.7 and he continued to have a productive cough with generalized malaise which prompted him to come to the ER for evaluation.    Upon arrival here he was noted to be febrile to 103.1 and tachycardic to 106 with stable blood pressure.  Lab work-up revealed a leukocytosis of 15,000 as well as mild lactic acidosis of 2.6.  Chest x-ray suggested bilateral infiltrates as well as a possible component of pulmonary edema as well as moderate to marked cardiomegaly.  Influenza was negative.  Patient was started on empiric cefepime and vancomycin.  Hospital service encounter for admission of suspected sepsis from pneumonia.  Patient states he does not have a history of a prior pneumonia and denies any sick contacts.  Currently only additional complaint is he does have some pleurisy but denies having any overt dyspnea at this time.      Personal History     Past Medical History:  Past Medical History:   Diagnosis Date   • Bronchitis    • Chronic pain    • Contusion, hip    • ESRD on dialysis (HCC)     • Headache    • History of brain shunt    • Hypertension    • Kidney disease    • Wrist sprain          Past Surgical History:  History reviewed. No pertinent surgical history.    shunt placement, AV fistula creation, AV graft placement, tunneled dialysis catheter placement      Family History:   Reviewed noncontributory    Social History:   Lives at home alone.  Independent of ADLs.  Quit smoking a week ago, 10-pack-year smoker.  Nondrinker.    Home Medications:  Losartan Potassium, NIFEdipine, calcium acetate, carvedilol, and cinacalcet    Allergies:  Allergies   Allergen Reactions   • Methylene Blue Anaphylaxis and Hives     Throat closes/ swelling    • Ace Inhibitors    • Latex    • Penicillins        Review of Systems   All systems were reviewed and negative except for: Fevers, chills, nausea, lightheadedness, pleurisy    Objective   Objective     Vitals:   Temp:  [99.3 °F (37.4 °C)-103.1 °F (39.5 °C)] 99.3 °F (37.4 °C)  Heart Rate:  [] 98  Resp:  [24] 24  BP: (120-149)/(74-98) 131/81    Physical Exam    Constitutional: Awake, alert, no acute distress   Eyes: Pupils equal, sclerae anicteric, no conjunctival injection   HENT: NCAT, mucous membranes dry   Neck: Supple, no thyromegaly, facial fullness noted on the right side, trachea midline   Respiratory: Decreased breath sounds at the right base, no wheezing or rhonchi, nonlabored respirations    Cardiovascular: RRR, no murmurs, rubs, or gallops, palpable pedal pulses bilaterally   Gastrointestinal: Positive bowel sounds, soft, nontender, nondistended   Musculoskeletal: No bilateral ankle edema, no clubbing or cyanosis to extremities.  RUE AV graft with palpable thrill   Psychiatric: Appropriate affect, cooperative   Neurologic: Oriented x 3, strength symmetric in all extremities, Cranial Nerves grossly intact to confrontation, speech clear   Skin: No rashes     Result Review    Result Review:  I have personally reviewed the results from the time of  this admission to 12/21/2022 03:22 EST and agree with these findings:  [x]  Laboratory  [x]  Microbiology  [x]  Radiology  []  EKG/Telemetry   [x]  Cardiology/Vascular   []  Pathology  [x]  Old records  []  Other:      Assessment & Plan   Assessment / Plan     Assessment/Plan:   Sepsis secondary to multifocal pneumonia  Cardiomegaly  Lactic acidosis likely secondary to sepsis  End-stage renal disease on HD  Status post  shunt placement  Chronic normocytic anemia  Hypertension    Plan  - Admit to inpatient on the hospital service  - Continue vancomycin and cefepime for sepsis secondary to multifocal pneumonia, follow-up blood cultures and sputum cultures  - Cardiomegaly seems to be a chronic process however given his pleuritic pain we will get a stat chest CT to rule out any large effusion that could be present, may need echocardiogram as well  - Clinically appears significantly volume depleted we will provide a 250 mL bolus of fluid at this time and recheck lactic acid later this morning to ensure adequate downtrend  - Nephrology consult for HD orders  - Trend hemoglobin and transfuse as needed  - We will hold antihypertensives for now      DVT prophylaxis:   Heparin    CODE STATUS:    Code Status (Patient has no pulse and is not breathing): CPR (Attempt to Resuscitate)  Medical Interventions (Patient has pulse or is breathing): Full Support      Admission Status:  I believe this patient meets inpatient status.    Electronically signed by Juan Herbert MD, 12/21/22, 3:22 AM EST.

## 2022-12-22 LAB
ANION GAP SERPL CALCULATED.3IONS-SCNC: 16.7 MMOL/L (ref 5–15)
BASOPHILS # BLD AUTO: 0.03 10*3/MM3 (ref 0–0.2)
BASOPHILS NFR BLD AUTO: 0.2 % (ref 0–1.5)
BUN SERPL-MCNC: 79 MG/DL (ref 6–20)
BUN/CREAT SERPL: 6.7 (ref 7–25)
CALCIUM SPEC-SCNC: 7.2 MG/DL (ref 8.6–10.5)
CHLORIDE SERPL-SCNC: 91 MMOL/L (ref 98–107)
CO2 SERPL-SCNC: 19.3 MMOL/L (ref 22–29)
CREAT SERPL-MCNC: 11.83 MG/DL (ref 0.76–1.27)
DEPRECATED RDW RBC AUTO: 60.5 FL (ref 37–54)
EGFRCR SERPLBLD CKD-EPI 2021: 5.3 ML/MIN/1.73
EOSINOPHIL # BLD AUTO: 0.09 10*3/MM3 (ref 0–0.4)
EOSINOPHIL NFR BLD AUTO: 0.6 % (ref 0.3–6.2)
ERYTHROCYTE [DISTWIDTH] IN BLOOD BY AUTOMATED COUNT: 18.6 % (ref 12.3–15.4)
GLUCOSE SERPL-MCNC: 109 MG/DL (ref 65–99)
HCT VFR BLD AUTO: 29.2 % (ref 37.5–51)
HGB BLD-MCNC: 9.6 G/DL (ref 13–17.7)
IMM GRANULOCYTES # BLD AUTO: 0.15 10*3/MM3 (ref 0–0.05)
IMM GRANULOCYTES NFR BLD AUTO: 0.9 % (ref 0–0.5)
LYMPHOCYTES # BLD AUTO: 0.71 10*3/MM3 (ref 0.7–3.1)
LYMPHOCYTES NFR BLD AUTO: 4.4 % (ref 19.6–45.3)
MAGNESIUM SERPL-MCNC: 2.3 MG/DL (ref 1.6–2.6)
MCH RBC QN AUTO: 29 PG (ref 26.6–33)
MCHC RBC AUTO-ENTMCNC: 32.9 G/DL (ref 31.5–35.7)
MCV RBC AUTO: 88.2 FL (ref 79–97)
MONOCYTES # BLD AUTO: 2.33 10*3/MM3 (ref 0.1–0.9)
MONOCYTES NFR BLD AUTO: 14.5 % (ref 5–12)
NEUTROPHILS NFR BLD AUTO: 12.73 10*3/MM3 (ref 1.7–7)
NEUTROPHILS NFR BLD AUTO: 79.4 % (ref 42.7–76)
NRBC BLD AUTO-RTO: 0 /100 WBC (ref 0–0.2)
PLATELET # BLD AUTO: 112 10*3/MM3 (ref 140–450)
PMV BLD AUTO: 11.4 FL (ref 6–12)
POTASSIUM SERPL-SCNC: 4.9 MMOL/L (ref 3.5–5.2)
RBC # BLD AUTO: 3.31 10*6/MM3 (ref 4.14–5.8)
SODIUM SERPL-SCNC: 127 MMOL/L (ref 136–145)
VANCOMYCIN SERPL-MCNC: 20.87 MCG/ML (ref 5–40)
WBC NRBC COR # BLD: 16.04 10*3/MM3 (ref 3.4–10.8)

## 2022-12-22 PROCEDURE — 5A1D70Z PERFORMANCE OF URINARY FILTRATION, INTERMITTENT, LESS THAN 6 HOURS PER DAY: ICD-10-PCS | Performed by: INTERNAL MEDICINE

## 2022-12-22 PROCEDURE — 36415 COLL VENOUS BLD VENIPUNCTURE: CPT | Performed by: STUDENT IN AN ORGANIZED HEALTH CARE EDUCATION/TRAINING PROGRAM

## 2022-12-22 PROCEDURE — 25010000002 CEFEPIME PER 500 MG: Performed by: STUDENT IN AN ORGANIZED HEALTH CARE EDUCATION/TRAINING PROGRAM

## 2022-12-22 PROCEDURE — 94799 UNLISTED PULMONARY SVC/PX: CPT

## 2022-12-22 PROCEDURE — 25010000002 VANCOMYCIN 5 G RECONSTITUTED SOLUTION: Performed by: FAMILY MEDICINE

## 2022-12-22 PROCEDURE — 80048 BASIC METABOLIC PNL TOTAL CA: CPT | Performed by: STUDENT IN AN ORGANIZED HEALTH CARE EDUCATION/TRAINING PROGRAM

## 2022-12-22 PROCEDURE — 80202 ASSAY OF VANCOMYCIN: CPT | Performed by: STUDENT IN AN ORGANIZED HEALTH CARE EDUCATION/TRAINING PROGRAM

## 2022-12-22 PROCEDURE — 85025 COMPLETE CBC W/AUTO DIFF WBC: CPT | Performed by: STUDENT IN AN ORGANIZED HEALTH CARE EDUCATION/TRAINING PROGRAM

## 2022-12-22 PROCEDURE — 99232 SBSQ HOSP IP/OBS MODERATE 35: CPT | Performed by: FAMILY MEDICINE

## 2022-12-22 PROCEDURE — 83735 ASSAY OF MAGNESIUM: CPT | Performed by: STUDENT IN AN ORGANIZED HEALTH CARE EDUCATION/TRAINING PROGRAM

## 2022-12-22 RX ADMIN — Medication 10 ML: at 21:31

## 2022-12-22 RX ADMIN — ACETAMINOPHEN 650 MG: 325 TABLET ORAL at 18:05

## 2022-12-22 RX ADMIN — Medication 10 ML: at 09:06

## 2022-12-22 RX ADMIN — VANCOMYCIN HYDROCHLORIDE 500 MG: 5 INJECTION, POWDER, LYOPHILIZED, FOR SOLUTION INTRAVENOUS at 21:32

## 2022-12-22 RX ADMIN — CEFEPIME 2 G: 1 INJECTION, SOLUTION INTRAVENOUS at 00:38

## 2022-12-22 NOTE — PROGRESS NOTES
"[facility] Clinical Pharmacy Services: Vancomycin Pharmacokinetic Consult Note    Ghassan Browning is a 32 y.o. male who is on day 1 of pharmacy to dose vancomycin for Sepsis.    Consulting Provider: Piedad  Planned Duration of Therapy: 7 days (through ) per consult  PK/PD Target: Dose by Levels  Imaging Reviewed?: Yes  Other Antimicrobials: Cefepime    Microbiology Data  MRSA PCR performed: No; Result: Not ordered due to excluded indication or presence of suspected abscess  Culture/Source:    Blood cx: in process - per [facility] MSSA    Vitals/Labs  Ht: 162.6 cm (64\"); Wt: 52.2 kg (115 lb 1.3 oz)  Temp (24hrs), Av.4 °F (36.9 °C), Min:97.2 °F (36.2 °C), Max:100.6 °F (38.1 °C)   Estimated Creatinine Clearance: 6.6 mL/min (A) (by C-G formula based on SCr of 11.83 mg/dL (H)).  Hemodialysis - [facility] schedule     Results from last 7 days   Lab Units 22  0438 22  0524 22  2354   VANCOMYCIN RM mcg/mL 20.87  --   --    CREATININE mg/dL 11.83* 10.25* 9.07*   WBC 10*3/mm3 16.04* 15.43* 15.14*     Assessment/Plan:    Recommend changing therapy to cefazolin given MSSA by [facility].  Vancomycin Dose: Will continue to pulse dose at this time due to HD. HD planned for today, level this AM 20.87.  Will dose with vancomycin 500 mg x 1 dose this evening if vancomycin is continued. Will continue to follow for planned HD schedule (usual schedule [facility]).  Loading dose received this morning @0242.  Vanc Random ordered for  at AM labs    Pharmacy will follow patient's kidney function and will adjust doses and obtain levels as necessary. Thank you for involving pharmacy in this patient's care. Please contact pharmacy with any questions or concerns.                           Pili Amin, [facility]  Clinical Pharmacist      "

## 2022-12-22 NOTE — PLAN OF CARE
Goal Outcome Evaluation:   Pt rested intermittently throughout shift. Pt received hemodialysis. Pt has no complaints at this time. VSS.         Progress: no change

## 2022-12-22 NOTE — PLAN OF CARE
Goal Outcome Evaluation:   Pt rested well with no complaints of pain.  Received antibiotic IV.  Fluid restriction WINS Allegra ARNOLD RN

## 2022-12-22 NOTE — PROGRESS NOTES
[facility]     Nephrology Progress Note      Patient Name: Ghassan Browning  : 1990  MRN: [id number]  Primary Care Physician:  Paulina Cullen MD  Date of admission: 2022    Subjective   Subjective     Interval History:  Patient Reports feeling okay.  No new complaints.  Tolerating p.o.  Seen in dialysis.  Some intermittent cough, low-grade fever.  COVID-positive and positive staph bacteremia.    Review of Systems   All systems were reviewed and negative except for: What is mentioned above.    Objective   Objective     Vitals:   Temp:  [97.2 °F (36.2 °C)-100.6 °F (38.1 °C)] 100.6 °F (38.1 °C)  Heart Rate:  [80-98] 98  Resp:  [20-22] 21  BP: (100-114)/(60-70) 114/67  Physical Exam:      Constitutional: Awake, alert, no distress, conversant and pleasant, sitting in bed edge.  Intermittent cough.              Eyes: sclerae anicteric, no conjunctival injection              HENT: mucous membranes moist              Neck: Supple, no thyromegaly, no lymphadenopathy, trachea midline, No JVD              Respiratory: Decreased to auscultation bilaterally, nonlabored respirations               Cardiovascular: RRR, no murmurs, rubs, or gallops.              Gastrointestinal: Positive bowel sounds, soft, nontender, nondistended              Musculoskeletal: No edema, no clubbing or cyanosis, right upper extremity AV graft, patent.              Psychiatric: Appropriate affect, cooperative              Neurologic: Oriented x 3, moving all extremities, Cranial Nerves grossly intact, speech clear              Skin: warm and dry, no rashes     Result Review    Result Reviewed:  I have personally reviewed the results from the time of this admission to 2022 12:21 EST and agree with these findings:  [x]  Laboratory  []  Microbiology  [x]  Radiology  []  EKG/Telemetry   []  Cardiology/Vascular   []  Pathology  []  Old records  []  Other:  Lab Results   Component Value Date    GLUCOSE 109 (H) 2022    CALCIUM  7.2 (L) 12/22/2022     (L) 12/22/2022    K 4.9 12/22/2022    CO2 19.3 (L) 12/22/2022    CL 91 (L) 12/22/2022    BUN 79 (H) 12/22/2022    CREATININE 11.83 (H) 12/22/2022    BCR 6.7 (L) 12/22/2022    ANIONGAP 16.7 (H) 12/22/2022     Lab Results   Component Value Date    CALCIUM 7.2 (L) 12/22/2022      Results from last 7 days   Lab Units 12/22/22  0438   MAGNESIUM mg/dL 2.3          Most notable findings include: Positive blood culture for staph, COVID-positive.  Sodium 127    Assessment & Plan   Assessment / Plan       Active Hospital Problems:  Active Hospital Problems    Diagnosis    • **Sepsis, due to unspecified organism, unspecified whether acute organ dysfunction present ([facility])    • Pericardial effusion    • ESRD (end stage renal disease) ([facility])        Assessment and Plan:    - End-stage renal disease, dialysis TTS schedule through right upper extremity AV graft.  Continue same schedule.  UF 2 kg as tolerated.  - History of SVC syndrome.  - COVID pneumonia, treatment Per primary, has positive blood cultures with staph.  Empirically on antibiotics.  Source unclear to me.  Had previously had vascular stents.  AV graft seems to be working well.  Site appeared okay.  - Hypertension, with hypotension.  Monitor for now.  - Hyponatremia with hypervolemia, continue  1500 cc p.o. fluid restriction and try to correct with dialysis.  - Metabolic acidosis, mild.    Should improve after dialysis.  - History of  shunt.  - Large pericardial effusion, evaluated by cardiology.  No tamponade.  - Hyperphosphatemia, recheck in a.m.  Avoid calcium based binders.  Discussed with dialysis staff.    Will follow.      Electronically signed by Reyna Torres MD, 12/22/22, 12:21 PM EST.

## 2022-12-23 ENCOUNTER — APPOINTMENT (OUTPATIENT)
Dept: CT IMAGING | Facility: HOSPITAL | Age: 32
DRG: 871 | End: 2022-12-23
Payer: MEDICARE

## 2022-12-23 LAB
ALBUMIN SERPL-MCNC: 3.7 G/DL (ref 3.5–5.2)
ANION GAP SERPL CALCULATED.3IONS-SCNC: 15.7 MMOL/L (ref 5–15)
BACTERIA SPEC AEROBE CULT: ABNORMAL
BACTERIA SPEC AEROBE CULT: ABNORMAL
BACTERIA SPEC RESP CULT: NORMAL
BUN SERPL-MCNC: 48 MG/DL (ref 6–20)
BUN/CREAT SERPL: 6.6 (ref 7–25)
CALCIUM SPEC-SCNC: 8 MG/DL (ref 8.6–10.5)
CHLORIDE SERPL-SCNC: 91 MMOL/L (ref 98–107)
CO2 SERPL-SCNC: 25.3 MMOL/L (ref 22–29)
CREAT SERPL-MCNC: 7.22 MG/DL (ref 0.76–1.27)
DEPRECATED RDW RBC AUTO: 62.9 FL (ref 37–54)
EGFRCR SERPLBLD CKD-EPI 2021: 9.5 ML/MIN/1.73
ERYTHROCYTE [DISTWIDTH] IN BLOOD BY AUTOMATED COUNT: 18.9 % (ref 12.3–15.4)
GLUCOSE SERPL-MCNC: 113 MG/DL (ref 65–99)
GRAM STN SPEC: ABNORMAL
GRAM STN SPEC: NORMAL
HCT VFR BLD AUTO: 31.7 % (ref 37.5–51)
HGB BLD-MCNC: 10.1 G/DL (ref 13–17.7)
ISOLATED FROM: ABNORMAL
ISOLATED FROM: ABNORMAL
MAGNESIUM SERPL-MCNC: 2.3 MG/DL (ref 1.6–2.6)
MCH RBC QN AUTO: 28.9 PG (ref 26.6–33)
MCHC RBC AUTO-ENTMCNC: 31.9 G/DL (ref 31.5–35.7)
MCV RBC AUTO: 90.8 FL (ref 79–97)
PHOSPHATE SERPL-MCNC: 1.6 MG/DL (ref 2.5–4.5)
PLATELET # BLD AUTO: 103 10*3/MM3 (ref 140–450)
PMV BLD AUTO: 10.6 FL (ref 6–12)
POTASSIUM SERPL-SCNC: 4.1 MMOL/L (ref 3.5–5.2)
RBC # BLD AUTO: 3.49 10*6/MM3 (ref 4.14–5.8)
SODIUM SERPL-SCNC: 132 MMOL/L (ref 136–145)
WBC NRBC COR # BLD: 13.87 10*3/MM3 (ref 3.4–10.8)

## 2022-12-23 PROCEDURE — 87040 BLOOD CULTURE FOR BACTERIA: CPT | Performed by: FAMILY MEDICINE

## 2022-12-23 PROCEDURE — 94799 UNLISTED PULMONARY SVC/PX: CPT

## 2022-12-23 PROCEDURE — 83735 ASSAY OF MAGNESIUM: CPT | Performed by: INTERNAL MEDICINE

## 2022-12-23 PROCEDURE — 25010000002 CEFAZOLIN 1-4 GM/50ML-% SOLUTION: Performed by: INTERNAL MEDICINE

## 2022-12-23 PROCEDURE — 80069 RENAL FUNCTION PANEL: CPT | Performed by: INTERNAL MEDICINE

## 2022-12-23 PROCEDURE — 97161 PT EVAL LOW COMPLEX 20 MIN: CPT

## 2022-12-23 PROCEDURE — 85027 COMPLETE CBC AUTOMATED: CPT | Performed by: INTERNAL MEDICINE

## 2022-12-23 PROCEDURE — 70490 CT SOFT TISSUE NECK W/O DYE: CPT

## 2022-12-23 PROCEDURE — 25010000002 CEFAZOLIN IN DEXTROSE 2-4 GM/100ML-% SOLUTION: Performed by: FAMILY MEDICINE

## 2022-12-23 PROCEDURE — 99233 SBSQ HOSP IP/OBS HIGH 50: CPT | Performed by: INTERNAL MEDICINE

## 2022-12-23 RX ORDER — CEFAZOLIN SODIUM 2 G/100ML
2 INJECTION, SOLUTION INTRAVENOUS EVERY 24 HOURS
Status: DISCONTINUED | OUTPATIENT
Start: 2022-12-23 | End: 2022-12-23

## 2022-12-23 RX ORDER — CARVEDILOL 12.5 MG/1
12.5 TABLET ORAL 2 TIMES DAILY
Status: DISCONTINUED | OUTPATIENT
Start: 2022-12-23 | End: 2023-01-01 | Stop reason: HOSPADM

## 2022-12-23 RX ORDER — CEFAZOLIN SODIUM 1 G/50ML
1 INJECTION, SOLUTION INTRAVENOUS EVERY 24 HOURS
Status: DISCONTINUED | OUTPATIENT
Start: 2022-12-23 | End: 2023-01-01

## 2022-12-23 RX ORDER — NIFEDIPINE 60 MG/1
60 TABLET, EXTENDED RELEASE ORAL DAILY
Status: DISCONTINUED | OUTPATIENT
Start: 2022-12-23 | End: 2023-01-01 | Stop reason: HOSPADM

## 2022-12-23 RX ORDER — LOSARTAN POTASSIUM 25 MG/1
100 TABLET ORAL NIGHTLY
Status: DISCONTINUED | OUTPATIENT
Start: 2022-12-23 | End: 2023-01-01 | Stop reason: HOSPADM

## 2022-12-23 RX ADMIN — CARVEDILOL 12.5 MG: 12.5 TABLET, FILM COATED ORAL at 21:37

## 2022-12-23 RX ADMIN — ALUMINUM HYDROXIDE, MAGNESIUM HYDROXIDE, AND DIMETHICONE 15 ML: 400; 400; 40 SUSPENSION ORAL at 23:48

## 2022-12-23 RX ADMIN — Medication 10 ML: at 08:20

## 2022-12-23 RX ADMIN — LOSARTAN POTASSIUM 100 MG: 50 TABLET, FILM COATED ORAL at 21:37

## 2022-12-23 RX ADMIN — ACETAMINOPHEN 650 MG: 325 TABLET ORAL at 23:48

## 2022-12-23 RX ADMIN — ACETAMINOPHEN 650 MG: 325 TABLET ORAL at 08:23

## 2022-12-23 RX ADMIN — CEFAZOLIN SODIUM 1 G: 1 INJECTION, SOLUTION INTRAVENOUS at 21:36

## 2022-12-23 RX ADMIN — ALUMINUM HYDROXIDE, MAGNESIUM HYDROXIDE, AND DIMETHICONE 15 ML: 400; 400; 40 SUSPENSION ORAL at 13:31

## 2022-12-23 RX ADMIN — CEFAZOLIN SODIUM 2 G: 2 INJECTION, SOLUTION INTRAVENOUS at 02:16

## 2022-12-23 RX ADMIN — Medication 10 ML: at 21:37

## 2022-12-23 RX ADMIN — NIFEDIPINE 60 MG: 60 TABLET, EXTENDED RELEASE ORAL at 13:31

## 2022-12-23 NOTE — PLAN OF CARE
Goal Outcome Evaluation:   BP a little elevated, MD aware. Dialysis tomorrow. No other changes. Will continue to monitor.

## 2022-12-23 NOTE — PLAN OF CARE
Goal Outcome Evaluation:  Plan of Care Reviewed With: patient           Outcome Evaluation: Patient presents with no strength deficits.  He is able to complete all transfers and ambulate independently in his room.  He does not need inpatient PT services.

## 2022-12-23 NOTE — PLAN OF CARE
Goal Outcome Evaluation:      Pt rested well with no complaints of pain.  Pt did seem restless at times due to difficulty breathing, continues to refuse bipap.  Received IV antibiotics.  JAMES ARNOLD RN

## 2022-12-23 NOTE — THERAPY EVALUATION
Acute Care - Physical Therapy Initial Evaluation   [facility]     Patient Name: Ghassan Browning  : 1990  MRN: [id number]  Today's Date: 2022      Visit Dx:     ICD-10-CM ICD-9-CM   1. Sepsis, due to unspecified organism, unspecified whether acute organ dysfunction present ([facility])  A41.9 038.9     995.91   2. ESRD (end stage renal disease) ([facility])  N18.6 585.6   3. Fever, unspecified fever cause  R50.9 780.60   4. Difficulty walking  R26.2 719.7     Patient Active Problem List   Diagnosis   • Sepsis, due to unspecified organism, unspecified whether acute organ dysfunction present ([facility])   • Pericardial effusion   • ESRD (end stage renal disease) ([facility])     Past Medical History:   Diagnosis Date   • Bronchitis    • Chronic pain    • Contusion, hip    • ESRD on dialysis ([facility])    • Headache    • History of brain shunt    • Hypertension    • Kidney disease    • Sleep apnea    • Wrist sprain      Past Surgical History:   Procedure Laterality Date   • CSF SHUNT Right      PT Assessment (last 12 hours)     PT Evaluation and Treatment     Row Name 22 1300          Physical Therapy Time and Intention    Subjective Information no complaints  -DP     Document Type evaluation  -DP     Mode of Treatment individual therapy;physical therapy  -DP     Patient Effort good  -DP     Row Name 22 1300          General Information    Patient Profile Reviewed yes  -DP     Patient Observations alert;cooperative;agree to therapy  -DP     Prior Level of Function independent:;gait;transfer;bed mobility;ADL's  -DP     Equipment Currently Used at Home none  -DP     Existing Precautions/Restrictions no known precautions/restrictions  -DP     Row Name 22 1300          Living Environment    Current Living Arrangements home  -DP     People in Home friend(s)  -DP     Row Name 22 1300          Range of Motion (ROM)    Range of Motion ROM is WFL  -DP     Row Name 22 1300          Strength (Manual Muscle Testing)     Strength (Manual Muscle Testing) bilateral lower extremities;strength is WFL  -DP     Row Name 12/23/22 1300          Bed Mobility    Bed Mobility supine-sit-supine  -DP     Supine-Sit-Supine Clarence (Bed Mobility) independent  -DP     Row Name 12/23/22 1300          Transfers    Transfers sit-stand transfer  -DP     Row Name 12/23/22 1300          Sit-Stand Transfer    Sit-Stand Clarence (Transfers) independent  -DP     Row Name 12/23/22 1300          Gait/Stairs (Locomotion)    Gait/Stairs Locomotion gait/ambulation independence  -DP     Clarence Level (Gait) independent  -DP     Assistive Device (Gait) other (see comments)  none  -DP     Distance in Feet (Gait) 100  -DP     Row Name 12/23/22 1300          Plan of Care Review    Plan of Care Reviewed With patient  -DP     Outcome Evaluation Patient presents with no strength deficits.  He is able to complete all transfers and ambulate independently in his room.  He does not need inpatient PT services.  -DP     Row Name 12/23/22 1300          Therapy Assessment/Plan (PT)    Criteria for Skilled Interventions Met (PT) no problems identified which require skilled intervention  -DP     Therapy Frequency (PT) evaluation only  -DP     Row Name 12/23/22 1300          PT Evaluation Complexity    History, PT Evaluation Complexity no personal factors and/or comorbidities  -DP     Examination of Body Systems (PT Eval Complexity) total of 4 or more elements  -DP     Clinical Presentation (PT Evaluation Complexity) stable  -DP     Clinical Decision Making (PT Evaluation Complexity) low complexity  -DP     Overall Complexity (PT Evaluation Complexity) low complexity  -DP           User Key  (r) = Recorded By, (t) = Taken By, (c) = Cosigned By    Initials Name Provider Type    Maurice Orlando, PT Physical Therapist                  PT Recommendation and Plan  Anticipated Discharge Disposition (PT): home  Therapy Frequency (PT): evaluation only  Plan of Care  Reviewed With: patient  Outcome Evaluation: Patient presents with no strength deficits.  He is able to complete all transfers and ambulate independently in his room.  He does not need inpatient PT services.   Outcome Measures     Row Name 12/23/22 1300             How much help from another person do you currently need...    Turning from your back to your side while in flat bed without using bedrails? 4  -DP      Moving from lying on back to sitting on the side of a flat bed without bedrails? 4  -DP      Moving to and from a bed to a chair (including a wheelchair)? 4  -DP      Standing up from a chair using your arms (e.g., wheelchair, bedside chair)? 4  -DP      Climbing 3-5 steps with a railing? 4  -DP      To walk in hospital room? 4  -DP      AM-PAC 6 Clicks Score (PT) 24  -DP         Functional Assessment    Outcome Measure Options AM-PAC 6 Clicks Basic Mobility (PT)  -DP            User Key  (r) = Recorded By, (t) = Taken By, (c) = Cosigned By    Initials Name Provider Type    Maurice Orlando, PT Physical Therapist                 Time Calculation:    PT Charges     Row Name 12/23/22 1319             Time Calculation    PT Received On 12/23/22  -DP         Untimed Charges    PT Eval/Re-eval Minutes 40  -DP         Total Minutes    Untimed Charges Total Minutes 40  -DP       Total Minutes 40  -DP            User Key  (r) = Recorded By, (t) = Taken By, (c) = Cosigned By    Initials Name Provider Type    Maurice Orlando, PT Physical Therapist              Therapy Charges for Today     Code Description Service Date Service Provider Modifiers Qty    46007701465 HC PT EVAL LOW COMPLEXITY 3 12/23/2022 Maurice Reilly, PT GP 1          PT G-Codes  Outcome Measure Options: AM-PAC 6 Clicks Basic Mobility (PT)  AM-PAC 6 Clicks Score (PT): 24    Maurice Reilly PT  12/23/2022

## 2022-12-23 NOTE — PROGRESS NOTES
[facility]   Hospitalist Progress Note  Date: 2022  Patient Name: Ghassan Browning  : 1990  MRN: [id number]  Date of admission: 2022      Subjective   Subjective     Chief Complaint: fever    Summary: 32 y.o. male past medical history of ESRD on HD , SVC syndrome status post stenting and  shunt placement, hypertension who presented to the ER due to 4-day history of worsening fevers chills nausea and myalgias.  Patient states that he woke up Saturday with significant fevers and chills as well as a productive cough.  This productive cough is worsened over the last few days.  He missed his dialysis session on Saturday but was able to make it yesterday and only complete 3 hours and 45 minutes of a normal 4-hour session.  Patient states this morning he woke up with a higher fever than usual with a 101.7 and he continued to have a productive cough with generalized malaise which prompted him to come to the ER for evaluation.     Upon arrival here he was noted to be febrile to 103.1 and tachycardic to 106 with stable blood pressure.  Lab work-up revealed a leukocytosis of 15,000 as well as mild lactic acidosis of 2.6.  Chest x-ray suggested bilateral infiltrates as well as a possible component of pulmonary edema as well as moderate to marked cardiomegaly.  Influenza was negative.  Patient was started on empiric cefepime and vancomycin.  Hospital service encounter for admission of suspected sepsis from pneumonia.       He was admitted and underwent work-up and treatment for sepsis and pneumonia.  Blood cultures returned positive for staph aureus.  Nephrology was consulted for dialysis while in the hospital.  Patient was noted to have a very large cardiac silhouette on x-ray so echocardiogram was ordered which showed a large pericardial effusion.  Cardiology was consulted and recommended no intervention due to lack of cardiac tamponade.    Interval Followup: COVID-19 positive  today.  Patient does not have any hypoxia.  Continues to have fevers from bacteremia (MSSA).  Currently on vancomycin and cefepime.  Reports he feels a lot better today.  Still has a rattly productive cough but no shortness of breath and no hypoxia.  He denies any chest pain.    Review of Systems  Denies abdominal pain  Generalized weakness    Objective   Objective     Vitals:   Temp:  [98 °F (36.7 °C)-101.4 °F (38.6 °C)] 101.4 °F (38.6 °C)  Heart Rate:  [80-98] 94  Resp:  [20-21] 21  BP: (100-114)/(60-70) 100/62  Physical Exam       Constitutional: Awake, alert, no acute distress              HENT: NCAT, mucous membranes dry              Neck: Supple, no thyromegaly, facial fullness noted on the right side, trachea midline              Respiratory:  Loose anterior rhonchi throat clears with cough, no wheezing, no rales, nonlabored respirations               Cardiovascular: RRR, no murmurs, rubs, or gallops, palpable pedal pulses bilaterally              Gastrointestinal: Positive bowel sounds, soft, nontender, nondistended              Musculoskeletal: No bilateral ankle edema, no clubbing or cyanosis to extremities.  RUE AV graft with palpable thrill              Psychiatric: Appropriate affect, cooperative              Neurologic: Oriented x 3, speech slurred but this is chronic for him              Skin: No rashes         Assessment & Plan   Assessment / Plan     Assessment/Plan:  • Sepsis secondary to multifocal pneumonia and bacteremia  • MSSA Bacteremia with suspected pulmonary source  • Large Pericardial effusion  • Lactic acidosis likely secondary to sepsis  • End-stage renal disease on HD  • Status post  shunt placement  • Chronic normocytic anemia  • Hypertension     Plan  - Remain inpatient on the hospital service  - Blood cultures positive for MSSA.  Change vancomycin and cefepime to Ancef  -Patient may need further work-up for source/seeding with his staph bacteremia.  Attempt to obtain sputum  culture  - Sputum cultures   - not hypoxic so no pharmacologic intervention for COVID.    - BiPAP for naps as the patient suffers from apnea while sleeping  - Cardiology did not recommend intervention for his large pericardial effusion and signed off.    - Follow blood cultures until clear  - Nephrology consult for HD.  Appreciate input  - Trend hemoglobin and transfuse as needed  - We will hold antihypertensives for now      Discussed plan with RN.    DVT prophylaxis:  No DVT prophylaxis order currently exists.    CODE STATUS:   Code Status (Patient has no pulse and is not breathing): CPR (Attempt to Resuscitate)  Medical Interventions (Patient has pulse or is breathing): Full Support        Electronically signed by Erik Mott DO, 12/22/22, 7:50 PM EST.

## 2022-12-23 NOTE — PROGRESS NOTES
[facility]   Hospitalist Progress Note  Date: 2022  Patient Name: Ghassan Browning  : 1990  MRN: [id number]  Date of admission: 2022  Consultants:   -Nephrology: Dr. Reyna Torres  -Cardiology: Dr. Cirilo May    Subjective   Subjective     Chief Complaint: Fever, chills, muscle aches    Summary:   Ghassan Browning is a 32 y.o. male ESRD on HD (TTS), SVC syndrome s/p stenting and  shunt placement, essential hypertension who presented to ED with 4-day history of worsening fevers, chills, nausea and myalgias.  Eval in ED significant for patient being febrile, tachycardic and labs showing leukocytosis and mildly elevated lactic acid.  CXR showed bilateral infiltrates.  Hospitalist service contacted for further evaluation and management.  CT chest imaging was obtained and showed bilateral infiltrates in addition to moderate to large pericardial effusion.  Nephrology and cardiology consulted.  Cardiology noted that no tamponade evident and no intervention warranted at this time.  Empiric antibiotics were initiated.  Initial blood culture returned positive for MSSA.    Interval Followup:   No acute events overnight.  Patient states he tolerated dialysis well yesterday.  Patient with T-max: 101.4 °F over the last 24 hours ([id number]).  Patient also stated that his blood pressure was elevated this morning and he could tell.  He denies any active chest pain, shortness breath, abdominal pain, nausea or vomiting.  He did have some complaints of scabbing and feeling like there was swelling below his jaw.  Nursing with no additional acute issues to report.    Antibiotics:   -Cefazolin    Review of Systems   All systems reviewed and negative unless stated otherwise under subjective.    Objective   Objective     Vitals:   Temp:  [98.4 °F (36.9 °C)-101.4 °F (38.6 °C)] 98.4 °F (36.9 °C)  Heart Rate:  [] 85  Resp:  [15-22] 21  BP: (100-184)/(62-82) 165/80  Physical Exam   Gen: No acute distress, cachectic  male, conversant, Pleasant, sitting up on edge of bed watching TV  HEENT: MMM, Atraumatic  Neck: Supple, Trachea midline, scabbing noted below the jaw possible abscess  Resp: No wheezing/Rales noted, minimal rhonchi appreciated, equal chest rise bilaterally, normal respiratory effort  Card: RRR, No m/r/g  Abd: Soft, Nontender, Nondistended, + bowel sounds  Ext: No cyanosis, No clubbing, peripheral muscle wasting noted  Neuro: CN II-XII grossly intact, No focal deficits appreciated  Psych: AAO x 3, Normal mood, Normal affect    Result Review    Result Review:  I have personally reviewed the results as below and agree with these findings:  []  Laboratory:   CMP    CMP 12/21/22 12/22/22 12/23/22   Glucose 117 (A) 109 (A) 113 (A)   BUN 54 (A) 79 (A) 48 (A)   Creatinine 10.25 (A) 11.83 (A) 7.22 (A)   Sodium 128 (A) 127 (A) 132 (A)   Potassium 4.4 4.9 4.1   Chloride 92 (A) 91 (A) 91 (A)   Calcium 7.9 (A) 7.2 (A) 8.0 (A)   Albumin   3.70   (A) Abnormal value            CBC    CBC 12/21/22 12/22/22 12/23/22   WBC 15.43 (A) 16.04 (A) 13.87 (A)   RBC 3.37 (A) 3.31 (A) 3.49 (A)   Hemoglobin 9.8 (A) 9.6 (A) 10.1 (A)   Hematocrit 31.0 (A) 29.2 (A) 31.7 (A)   MCV 92.0 88.2 90.8   MCH 29.1 29.0 28.9   MCHC 31.6 32.9 31.9   RDW 18.9 (A) 18.6 (A) 18.9 (A)   Platelets 141 112 (A) 103 (A)   (A) Abnormal value            [x]  Microbiology:   []  Radiology:   [x]  EKG/Telemetry: Sinus rhythm.  Tachycardia at times.  NSVT.  []  Cardiology/Vascular:    []  Pathology:  []  Old records:  []  Other:    Assessment & Plan   Assessment / Plan     Assessment:  MSSA bacteremia  Sepsis secondary to above, present on admission  Large pericardial effusion without cardiac tamponade  Lactic acidosis likely secondary to sepsis  ESRD on hemodialysis  Chronic normocytic anemia  Essential hypertension  Severe protein calorie malnutrition    Plan:  -Nephrology and Cardiology consulted and following, appreciate assistance and recommendations in the care of  this patient.  -Start patient's home antihypertensive medications, monitor blood pressure closely  -Continue cefazolin.  Repeat blood cultures ordered.  Follow-up results.  -Unclear source of bacteremia at this time, patient with complaints of swelling below the jaw, will obtain CT neck soft tissue for further evaluation  -Patient is to wear BiPAP with naps and at night  -Hemodialysis per nephrology  -Will monitor electrolytes and renal function with BMP and magnesium level in the AM  -Will monitor WBC and Hgb with CBC in the AM  -Clinical course will dictate further management     DVT Prophylaxis: SCDs  Diet: Renal  Dispo: Home when medically appropriate for discharge  Code Status: Full code     Personally reviewed patients labs and imaging, discussed with patient and nurse at bedside. Discussed case with the following consultants: Nephrology and Cardiology.     Part of this note may be an electronic transcription/translation of spoken language to printed text using the Dragon dictation system.    DVT prophylaxis:  Mechanical DVT prophylaxis orders are present.    CODE STATUS:   Code Status (Patient has no pulse and is not breathing): CPR (Attempt to Resuscitate)  Medical Interventions (Patient has pulse or is breathing): Full Support    Electronically signed by Delon Crook MD, 12/23/22, 12:42 PM EST.

## 2022-12-23 NOTE — PROGRESS NOTES
[facility]     Nephrology Progress Note      Patient Name: Ghassan Browning  : 1990  MRN: [id number]  Primary Care Physician:  Paulina Cullen MD  Date of admission: 2022    Subjective   Subjective     Interval History:  Patient Reports feeling okay.  Tired.  Still febrile.  No nausea or vomiting.  Tolerating p.o.   Some intermittent cough,  COVID-positive and positive staph bacteremia.    Review of Systems   All systems were reviewed and negative except for: What is mentioned above.    Objective   Objective     Vitals:   Temp:  [98.4 °F (36.9 °C)-101.4 °F (38.6 °C)] 100.4 °F (38 °C)  Heart Rate:  [] 99  Resp:  [15-22] 20  BP: (155-184)/(70-82) 180/75  Physical Exam:      Constitutional: Awake, alert, no distress, conversant and pleasant, lying in bed.  Intermittent cough.              Eyes: sclerae anicteric, no conjunctival injection              HENT: mucous membranes moist              Neck: Supple, no thyromegaly, no lymphadenopathy, trachea midline, No JVD              Respiratory: Decreased to auscultation bilaterally, nonlabored respirations               Cardiovascular: RRR, no murmurs, rubs, or gallops.              Gastrointestinal: Positive bowel sounds, soft, nontender, nondistended              Musculoskeletal: No edema, no clubbing or cyanosis, right upper extremity AV graft, patent.              Psychiatric: Appropriate affect, cooperative              Neurologic: Oriented x 3, moving all extremities, Cranial Nerves grossly intact, speech clear              Skin: warm and dry, no rashes     Result Review    Result Reviewed:  I have personally reviewed the results from the time of this admission to 2022 16:22 EST and agree with these findings:  [x]  Laboratory  []  Microbiology  [x]  Radiology  []  EKG/Telemetry   []  Cardiology/Vascular   []  Pathology  []  Old records  []  Other:  Lab Results   Component Value Date    GLUCOSE 113 (H) 2022    CALCIUM 8.0 (L)  12/23/2022     (L) 12/23/2022    K 4.1 12/23/2022    CO2 25.3 12/23/2022    CL 91 (L) 12/23/2022    BUN 48 (H) 12/23/2022    CREATININE 7.22 (H) 12/23/2022    BCR 6.6 (L) 12/23/2022    ANIONGAP 15.7 (H) 12/23/2022     Lab Results   Component Value Date    CALCIUM 8.0 (L) 12/23/2022    PHOS 1.6 (C) 12/23/2022      Results from last 7 days   Lab Units 12/23/22  0433   MAGNESIUM mg/dL 2.3              Assessment & Plan   Assessment / Plan       Active Hospital Problems:  Active Hospital Problems    Diagnosis    • **Sepsis, due to unspecified organism, unspecified whether acute organ dysfunction present ([facility])    • Pericardial effusion    • ESRD (end stage renal disease) ([facility])        Assessment and Plan:    - End-stage renal disease, dialysis TTS schedule through right upper extremity AV graft.  Continue same schedule.  UF 1-2 kg as tolerated.  - History of SVC syndrome.  - COVID pneumonia, treatment Per primary, has positive blood cultures with staph.  Empirically on antibiotics.  Source unclear to me.  Had previously had vascular stents.  AV graft seems to be working well.  Site appeared okay.  - Hypertension, with hypotension.  Monitor for now.  - Hyponatremia with hypervolemia, continue  1500 cc p.o. fluid restriction and try to correct with dialysis.  - Metabolic acidosis, mild.    Should improve after dialysis.  - History of  shunt.  - Large pericardial effusion, evaluated by cardiology.  No tamponade.  - Hyperphosphatemia, much lower after dialysis.  Recheck in AM.   Discussed with nursing staff.    Will follow.      Electronically signed by Reyna Torres MD, 12/22/22, 12:21 PM EST.

## 2022-12-24 LAB
ALBUMIN SERPL-MCNC: 3.5 G/DL (ref 3.5–5.2)
ALBUMIN/GLOB SERPL: 0.9 G/DL
ALP SERPL-CCNC: 91 U/L (ref 39–117)
ALT SERPL W P-5'-P-CCNC: <5 U/L (ref 1–41)
ANION GAP SERPL CALCULATED.3IONS-SCNC: 15.4 MMOL/L (ref 5–15)
ANISOCYTOSIS BLD QL: NORMAL
AST SERPL-CCNC: 16 U/L (ref 1–40)
BASOPHILS # BLD AUTO: 0.04 10*3/MM3 (ref 0–0.2)
BASOPHILS NFR BLD AUTO: 0.3 % (ref 0–1.5)
BILIRUB SERPL-MCNC: 0.4 MG/DL (ref 0–1.2)
BUN SERPL-MCNC: 72 MG/DL (ref 6–20)
BUN/CREAT SERPL: 7.6 (ref 7–25)
CALCIUM SPEC-SCNC: 7.7 MG/DL (ref 8.6–10.5)
CHLORIDE SERPL-SCNC: 88 MMOL/L (ref 98–107)
CO2 SERPL-SCNC: 26.6 MMOL/L (ref 22–29)
CREAT SERPL-MCNC: 9.46 MG/DL (ref 0.76–1.27)
DEPRECATED RDW RBC AUTO: 61.2 FL (ref 37–54)
EGFRCR SERPLBLD CKD-EPI 2021: 6.9 ML/MIN/1.73
EOSINOPHIL # BLD AUTO: 0.25 10*3/MM3 (ref 0–0.4)
EOSINOPHIL NFR BLD AUTO: 1.8 % (ref 0.3–6.2)
ERYTHROCYTE [DISTWIDTH] IN BLOOD BY AUTOMATED COUNT: 19.2 % (ref 12.3–15.4)
GLOBULIN UR ELPH-MCNC: 4 GM/DL
GLUCOSE SERPL-MCNC: 118 MG/DL (ref 65–99)
HCT VFR BLD AUTO: 29.8 % (ref 37.5–51)
HGB BLD-MCNC: 9.5 G/DL (ref 13–17.7)
IMM GRANULOCYTES # BLD AUTO: 0.06 10*3/MM3 (ref 0–0.05)
IMM GRANULOCYTES NFR BLD AUTO: 0.4 % (ref 0–0.5)
LYMPHOCYTES # BLD AUTO: 1.21 10*3/MM3 (ref 0.7–3.1)
LYMPHOCYTES NFR BLD AUTO: 8.5 % (ref 19.6–45.3)
MACROCYTES BLD QL SMEAR: NORMAL
MAGNESIUM SERPL-MCNC: 2.6 MG/DL (ref 1.6–2.6)
MCH RBC QN AUTO: 28 PG (ref 26.6–33)
MCHC RBC AUTO-ENTMCNC: 31.9 G/DL (ref 31.5–35.7)
MCV RBC AUTO: 87.9 FL (ref 79–97)
MONOCYTES # BLD AUTO: 2.95 10*3/MM3 (ref 0.1–0.9)
MONOCYTES NFR BLD AUTO: 20.7 % (ref 5–12)
NEUTROPHILS NFR BLD AUTO: 68.3 % (ref 42.7–76)
NEUTROPHILS NFR BLD AUTO: 9.74 10*3/MM3 (ref 1.7–7)
NRBC BLD AUTO-RTO: 0.1 /100 WBC (ref 0–0.2)
PHOSPHATE SERPL-MCNC: 0.6 MG/DL (ref 2.5–4.5)
PLAT MORPH BLD: NORMAL
PLATELET # BLD AUTO: 124 10*3/MM3 (ref 140–450)
PMV BLD AUTO: 12.1 FL (ref 6–12)
POTASSIUM SERPL-SCNC: 3.8 MMOL/L (ref 3.5–5.2)
PROT SERPL-MCNC: 7.5 G/DL (ref 6–8.5)
RBC # BLD AUTO: 3.39 10*6/MM3 (ref 4.14–5.8)
SODIUM SERPL-SCNC: 130 MMOL/L (ref 136–145)
TARGETS BLD QL SMEAR: NORMAL
VANCOMYCIN SERPL-MCNC: 20.76 MCG/ML (ref 5–40)
WBC MORPH BLD: NORMAL
WBC NRBC COR # BLD: 14.25 10*3/MM3 (ref 3.4–10.8)

## 2022-12-24 PROCEDURE — 85025 COMPLETE CBC W/AUTO DIFF WBC: CPT | Performed by: FAMILY MEDICINE

## 2022-12-24 PROCEDURE — 94799 UNLISTED PULMONARY SVC/PX: CPT

## 2022-12-24 PROCEDURE — 99233 SBSQ HOSP IP/OBS HIGH 50: CPT | Performed by: INTERNAL MEDICINE

## 2022-12-24 PROCEDURE — 83735 ASSAY OF MAGNESIUM: CPT | Performed by: INTERNAL MEDICINE

## 2022-12-24 PROCEDURE — 85007 BL SMEAR W/DIFF WBC COUNT: CPT | Performed by: FAMILY MEDICINE

## 2022-12-24 PROCEDURE — 80053 COMPREHEN METABOLIC PANEL: CPT | Performed by: FAMILY MEDICINE

## 2022-12-24 PROCEDURE — 84100 ASSAY OF PHOSPHORUS: CPT | Performed by: FAMILY MEDICINE

## 2022-12-24 PROCEDURE — 80202 ASSAY OF VANCOMYCIN: CPT | Performed by: FAMILY MEDICINE

## 2022-12-24 RX ORDER — LIDOCAINE AND PRILOCAINE 25; 25 MG/G; MG/G
1 CREAM TOPICAL 3 TIMES WEEKLY
Status: DISCONTINUED | OUTPATIENT
Start: 2022-12-24 | End: 2023-01-01 | Stop reason: HOSPADM

## 2022-12-24 RX ORDER — SODIUM PHOSPHATE IN D5W 15MMOL/250
15 PLASTIC BAG, INJECTION (ML) INTRAVENOUS
Status: COMPLETED | OUTPATIENT
Start: 2022-12-24 | End: 2022-12-24

## 2022-12-24 RX ORDER — SODIUM PHOSPHATE IN D5W 15MMOL/250
30 PLASTIC BAG, INJECTION (ML) INTRAVENOUS ONCE
Status: DISCONTINUED | OUTPATIENT
Start: 2022-12-24 | End: 2022-12-24

## 2022-12-24 RX ADMIN — ALUMINUM HYDROXIDE, MAGNESIUM HYDROXIDE, AND DIMETHICONE 15 ML: 400; 400; 40 SUSPENSION ORAL at 11:30

## 2022-12-24 RX ADMIN — ALUMINUM HYDROXIDE, MAGNESIUM HYDROXIDE, AND DIMETHICONE 15 ML: 400; 400; 40 SUSPENSION ORAL at 18:51

## 2022-12-24 RX ADMIN — NIFEDIPINE 60 MG: 60 TABLET, EXTENDED RELEASE ORAL at 11:09

## 2022-12-24 RX ADMIN — CARVEDILOL 12.5 MG: 12.5 TABLET, FILM COATED ORAL at 22:53

## 2022-12-24 RX ADMIN — Medication 10 ML: at 11:10

## 2022-12-24 RX ADMIN — LIDOCAINE AND PRILOCAINE 1 APPLICATION: 25; 25 CREAM TOPICAL at 18:51

## 2022-12-24 RX ADMIN — SODIUM PHOSPHATE, MONOBASIC, MONOHYDRATE AND SODIUM PHOSPHATE, DIBASIC, ANHYDROUS 15 MMOL: 276; 142 INJECTION, SOLUTION INTRAVENOUS at 15:27

## 2022-12-24 RX ADMIN — LOSARTAN POTASSIUM 100 MG: 50 TABLET, FILM COATED ORAL at 22:53

## 2022-12-24 RX ADMIN — Medication 10 ML: at 22:54

## 2022-12-24 RX ADMIN — SODIUM PHOSPHATE, MONOBASIC, MONOHYDRATE AND SODIUM PHOSPHATE, DIBASIC, ANHYDROUS 15 MMOL: 276; 142 INJECTION, SOLUTION INTRAVENOUS at 11:09

## 2022-12-24 RX ADMIN — CARVEDILOL 12.5 MG: 12.5 TABLET, FILM COATED ORAL at 11:09

## 2022-12-24 NOTE — PROGRESS NOTES
[facility]   Hospitalist Progress Note  Date: 2022  Patient Name: Ghassan Browning  : 1990  MRN: [id number]  Date of admission: 2022  Consultants:   -Nephrology: Dr. Reyna Torres  -Cardiology: Dr. Cirilo May    Subjective   Subjective     Chief Complaint: Fever, chills, muscle aches    Summary:   Ghassan Browning is a 32 y.o. male ESRD on HD (TTS), SVC syndrome s/p stenting and  shunt placement, essential hypertension who presented to ED with 4-day history of worsening fevers, chills, nausea and myalgias.  Eval in ED significant for patient being febrile, tachycardic and labs showing leukocytosis and mildly elevated lactic acid.  CXR showed bilateral infiltrates.  Hospitalist service contacted for further evaluation and management.  CT chest imaging was obtained and showed bilateral infiltrates in addition to moderate to large pericardial effusion.  Nephrology and cardiology consulted.  Cardiology noted that no tamponade evident and no intervention warranted at this time.  Empiric antibiotics were initiated.  Initial blood culture returned positive for MSSA.    Interval Followup:   No acute events overnight.  Patient's blood pressure improved.  Patient denied any chest pain, shortness of breath, abdominal pain, nausea or vomiting.  Patient tolerating diet.  Patient denies any complaints of peripheral neuropathy which she says have been present for extended amount of time.  Nursing with no additional acute issues to report.    Antibiotics:   -Cefazolin    Review of Systems   All systems reviewed and negative unless stated otherwise under subjective.    Objective   Objective     Vitals:   Temp:  [98.4 °F (36.9 °C)-101 °F (38.3 °C)] 99.4 °F (37.4 °C)  Heart Rate:  [] 96  Resp:  [20-24] 24  BP: (114-180)/(70-80) 126/70  Physical Exam   Gen: No acute distress, cachectic male, conversant, Pleasant, resting comfortably in bed, easily arousable  HEENT: MMM, Atraumatic  Neck: Supple, Trachea  midline, scabbing noted below the jaw possible abscess  Resp: No wheezing/Rales noted, minimal rhonchi appreciated, no increase in work of breathing, equal chest rise bilaterally  Card: RRR, No m/r/g  Abd: Soft, Nontender, Nondistended, + bowel sounds  Ext: No cyanosis, No clubbing, peripheral muscle wasting noted, right upper extremity AV graft noted  Neuro: CN II-XII grossly intact, No focal deficits appreciated  Psych: AAO x 3, Normal mood, Normal affect    Result Review    Result Review:  I have personally reviewed the results as below and agree with these findings:  []  Laboratory:   CMP    CMP 12/21/22 12/22/22 12/23/22   Glucose 117 (A) 109 (A) 113 (A)   BUN 54 (A) 79 (A) 48 (A)   Creatinine 10.25 (A) 11.83 (A) 7.22 (A)   Sodium 128 (A) 127 (A) 132 (A)   Potassium 4.4 4.9 4.1   Chloride 92 (A) 91 (A) 91 (A)   Calcium 7.9 (A) 7.2 (A) 8.0 (A)   Albumin   3.70   (A) Abnormal value            CBC    CBC 12/21/22 12/22/22 12/23/22   WBC 15.43 (A) 16.04 (A) 13.87 (A)   RBC 3.37 (A) 3.31 (A) 3.49 (A)   Hemoglobin 9.8 (A) 9.6 (A) 10.1 (A)   Hematocrit 31.0 (A) 29.2 (A) 31.7 (A)   MCV 92.0 88.2 90.8   MCH 29.1 29.0 28.9   MCHC 31.6 32.9 31.9   RDW 18.9 (A) 18.6 (A) 18.9 (A)   Platelets 141 112 (A) 103 (A)   (A) Abnormal value            [x]  Microbiology: Blood culture (12/23/2022): No growth to date  [x]  Radiology: CT neck soft tissue imaging personally reviewed.  Lymphadenopathy noted within the neck.  Soft tissue changes appreciated below patient's jaw where patient has wound, no overt abscess identified  [x]  EKG/Telemetry: NSVT.  Sinus rhythm.  No acute events.  []  Cardiology/Vascular:    []  Pathology:  []  Old records:  []  Other:    Assessment & Plan   Assessment / Plan     Assessment:  MSSA bacteremia  Sepsis secondary to above, present on admission  Large pericardial effusion without cardiac tamponade  Lactic acidosis likely secondary to sepsis  Severe hypophosphatemia  ESRD on hemodialysis  Chronic  normocytic anemia  Essential hypertension  Severe protein calorie malnutrition    Plan:  -Nephrology and Cardiology consulted and following, appreciate assistance and recommendations in the care of this patient.  -Patient with improved blood pressure after resumption of home antihypertensive medications, will continue  -Continue cefazolin.  Repeat blood cultures ordered.  Follow-up results.  -CT neck soft tissue imaging reviewed.  No abscess appreciated there is inflammation in the area for possible infectious source.  Repeat blood cultures with no growth to date after 24 hours.  -Patient is to wear BiPAP with naps and at night  -Hemodialysis per nephrology  -Discontinue telemetry  -Monitor electrolytes and renal function with BMP and magnesium level in the AM  -Monitor WBC and Hgb with CBC in the AM  -Clinical course will dictate further management     DVT Prophylaxis: SCDs  Diet: Renal  Dispo: Home when medically appropriate for discharge  Code Status: Full code     Personally reviewed patients labs and imaging, discussed with patient and nurse at bedside. Discussed case with the following consultants: Nephrology and Cardiology.     Part of this note may be an electronic transcription/translation of spoken language to printed text using the Dragon dictation system.    DVT prophylaxis:  Mechanical DVT prophylaxis orders are present.    CODE STATUS:   Code Status (Patient has no pulse and is not breathing): CPR (Attempt to Resuscitate)  Medical Interventions (Patient has pulse or is breathing): Full Support    Electronically signed by Delon Crook MD, 12/24/22, 6:20 AM EST.

## 2022-12-24 NOTE — PLAN OF CARE
Goal Outcome Evaluation:  Plan of Care Reviewed With: patient        Progress: no change     Patient stable this shift. Complained of heartburn and indigestion. Medicated per emar. Dialysis tonight around shift change per dialysis nurse. Will continue to monitor.

## 2022-12-24 NOTE — PROGRESS NOTES
[facility]     Nephrology Progress Note      Patient Name: Ghassan Browning  : 1990  MRN: [id number]  Primary Care Physician:  Paulina Cullen MD  Date of admission: 2022    Subjective   Subjective     Interval History:  Patient Reports feeling better but still weak and tired.  Afebrile. No nausea or vomiting.  Tolerating p.o.   Some intermittent cough, COVID-positive and positive staph bacteremia.    Review of Systems   All systems were reviewed and negative except for: What is mentioned above.    Objective   Objective     Vitals:   Temp:  [98.8 °F (37.1 °C)-100.4 °F (38 °C)] 99.7 °F (37.6 °C)  Heart Rate:  [] 106  Resp:  [20-24] 20  BP: (114-180)/(55-76) 114/59  Physical Exam:      Constitutional: Awake, alert, no distress, conversant and pleasant, lying in bed.  Intermittent cough.              Eyes: sclerae anicteric, no conjunctival injection              HENT: mucous membranes moist              Neck: Supple, no thyromegaly, no lymphadenopathy, trachea midline, No JVD              Respiratory: Decreased to auscultation bilaterally, nonlabored respirations               Cardiovascular: RRR, no murmurs, rubs, or gallops.              Gastrointestinal: Positive bowel sounds, soft, nontender, nondistended              Musculoskeletal: No edema, no clubbing or cyanosis, right upper extremity AV graft, patent.              Psychiatric: Appropriate affect, cooperative              Neurologic: Oriented x 3, moving all extremities, Cranial Nerves grossly intact, speech clear              Skin: warm and dry, no rashes    Unchanged from yesterday.    Result Review    Result Reviewed:  I have personally reviewed the results from the time of this admission to 2022 13:37 EST and agree with these findings:  [x]  Laboratory  []  Microbiology  [x]  Radiology  []  EKG/Telemetry   []  Cardiology/Vascular   []  Pathology  []  Old records  []  Other:  Lab Results   Component Value Date    GLUCOSE 118  (H) 12/24/2022    CALCIUM 7.7 (L) 12/24/2022     (L) 12/24/2022    K 3.8 12/24/2022    CO2 26.6 12/24/2022    CL 88 (L) 12/24/2022    BUN 72 (H) 12/24/2022    CREATININE 9.46 (H) 12/24/2022    BCR 7.6 12/24/2022    ANIONGAP 15.4 (H) 12/24/2022     Lab Results   Component Value Date    CALCIUM 7.7 (L) 12/24/2022    PHOS 0.6 (C) 12/24/2022      Results from last 7 days   Lab Units 12/24/22  0620   MAGNESIUM mg/dL 2.6              Assessment & Plan   Assessment / Plan       Active Hospital Problems:  Active Hospital Problems    Diagnosis    • **Sepsis, due to unspecified organism, unspecified whether acute organ dysfunction present ([facility])    • Pericardial effusion    • ESRD (end stage renal disease) ([facility])        Assessment and Plan:    - End-stage renal disease, dialysis TTS schedule through right upper extremity AV graft.  Continue same schedule.  UF 1-2 kg as tolerated.  - History of SVC syndrome.  - COVID pneumonia, and MSSA bacteremia, on antibiotics per primary.  Source unclear to me.  Had previously had vascular stents.  AV graft seems to be working well.  Site appeared okay.  - Hypertension, with hypotension.  Monitor for now.  - Hyponatremia with hypervolemia, continue 2000 cc p.o. fluid restriction per day and try to correct with dialysis.  - Metabolic acidosis, mild.  Improved after dialysis.  - History of  shunt.  - Large pericardial effusion, evaluated by cardiology.  No tamponade.  45 to 50% EF.  Severe LVH with diastolic dysfunction.  - Hyperphosphatemia, then hypophosphatemia.  Replaced this morning.  Change Novasource renal to other supplements that contain some phosphorus.  Discussed with nursing staff.    Will follow.      Electronically signed by Reyna Torres MD, 12/22/22, 12:21 PM EST.

## 2022-12-25 LAB
ALBUMIN SERPL-MCNC: 3.4 G/DL (ref 3.5–5.2)
ALBUMIN/GLOB SERPL: 0.8 G/DL
ALP SERPL-CCNC: 93 U/L (ref 39–117)
ALT SERPL W P-5'-P-CCNC: <5 U/L (ref 1–41)
ANION GAP SERPL CALCULATED.3IONS-SCNC: 13.5 MMOL/L (ref 5–15)
AST SERPL-CCNC: 14 U/L (ref 1–40)
BASOPHILS # BLD AUTO: 0.05 10*3/MM3 (ref 0–0.2)
BASOPHILS NFR BLD AUTO: 0.4 % (ref 0–1.5)
BILIRUB SERPL-MCNC: 0.3 MG/DL (ref 0–1.2)
BUN SERPL-MCNC: 42 MG/DL (ref 6–20)
BUN/CREAT SERPL: 6.4 (ref 7–25)
CALCIUM SPEC-SCNC: 7.8 MG/DL (ref 8.6–10.5)
CHLORIDE SERPL-SCNC: 91 MMOL/L (ref 98–107)
CO2 SERPL-SCNC: 26.5 MMOL/L (ref 22–29)
CREAT SERPL-MCNC: 6.6 MG/DL (ref 0.76–1.27)
DEPRECATED RDW RBC AUTO: 63.5 FL (ref 37–54)
EGFRCR SERPLBLD CKD-EPI 2021: 10.6 ML/MIN/1.73
EOSINOPHIL # BLD AUTO: 0.24 10*3/MM3 (ref 0–0.4)
EOSINOPHIL NFR BLD AUTO: 2 % (ref 0.3–6.2)
ERYTHROCYTE [DISTWIDTH] IN BLOOD BY AUTOMATED COUNT: 19.5 % (ref 12.3–15.4)
GLOBULIN UR ELPH-MCNC: 4.2 GM/DL
GLUCOSE SERPL-MCNC: 138 MG/DL (ref 65–99)
HCT VFR BLD AUTO: 27.1 % (ref 37.5–51)
HGB BLD-MCNC: 8.6 G/DL (ref 13–17.7)
IMM GRANULOCYTES # BLD AUTO: 0.07 10*3/MM3 (ref 0–0.05)
IMM GRANULOCYTES NFR BLD AUTO: 0.6 % (ref 0–0.5)
LYMPHOCYTES # BLD AUTO: 0.85 10*3/MM3 (ref 0.7–3.1)
LYMPHOCYTES NFR BLD AUTO: 7.2 % (ref 19.6–45.3)
MAGNESIUM SERPL-MCNC: 2.2 MG/DL (ref 1.6–2.6)
MCH RBC QN AUTO: 28.1 PG (ref 26.6–33)
MCHC RBC AUTO-ENTMCNC: 31.7 G/DL (ref 31.5–35.7)
MCV RBC AUTO: 88.6 FL (ref 79–97)
MONOCYTES # BLD AUTO: 2.36 10*3/MM3 (ref 0.1–0.9)
MONOCYTES NFR BLD AUTO: 19.9 % (ref 5–12)
NEUTROPHILS NFR BLD AUTO: 69.9 % (ref 42.7–76)
NEUTROPHILS NFR BLD AUTO: 8.3 10*3/MM3 (ref 1.7–7)
NRBC BLD AUTO-RTO: 0 /100 WBC (ref 0–0.2)
PHOSPHATE SERPL-MCNC: 0.8 MG/DL (ref 2.5–4.5)
PLATELET # BLD AUTO: 148 10*3/MM3 (ref 140–450)
PMV BLD AUTO: 11.7 FL (ref 6–12)
POTASSIUM SERPL-SCNC: 3.6 MMOL/L (ref 3.5–5.2)
PROT SERPL-MCNC: 7.6 G/DL (ref 6–8.5)
RBC # BLD AUTO: 3.06 10*6/MM3 (ref 4.14–5.8)
SODIUM SERPL-SCNC: 131 MMOL/L (ref 136–145)
WBC NRBC COR # BLD: 11.87 10*3/MM3 (ref 3.4–10.8)

## 2022-12-25 PROCEDURE — 85025 COMPLETE CBC W/AUTO DIFF WBC: CPT | Performed by: FAMILY MEDICINE

## 2022-12-25 PROCEDURE — 25010000002 CEFAZOLIN 1-4 GM/50ML-% SOLUTION: Performed by: INTERNAL MEDICINE

## 2022-12-25 PROCEDURE — 99233 SBSQ HOSP IP/OBS HIGH 50: CPT | Performed by: INTERNAL MEDICINE

## 2022-12-25 PROCEDURE — 80053 COMPREHEN METABOLIC PANEL: CPT | Performed by: FAMILY MEDICINE

## 2022-12-25 PROCEDURE — 83735 ASSAY OF MAGNESIUM: CPT | Performed by: INTERNAL MEDICINE

## 2022-12-25 PROCEDURE — 84100 ASSAY OF PHOSPHORUS: CPT | Performed by: FAMILY MEDICINE

## 2022-12-25 RX ORDER — CALCITRIOL 0.25 UG/1
0.25 CAPSULE, LIQUID FILLED ORAL ONCE
Status: COMPLETED | OUTPATIENT
Start: 2022-12-25 | End: 2022-12-25

## 2022-12-25 RX ORDER — FENTANYL/ROPIVACAINE/NS/PF 2-625MCG/1
15 PLASTIC BAG, INJECTION (ML) EPIDURAL ONCE
Status: COMPLETED | OUTPATIENT
Start: 2022-12-25 | End: 2022-12-25

## 2022-12-25 RX ORDER — CALCITRIOL 0.25 UG/1
0.25 CAPSULE, LIQUID FILLED ORAL EVERY OTHER DAY
Status: DISCONTINUED | OUTPATIENT
Start: 2022-12-27 | End: 2023-01-01 | Stop reason: HOSPADM

## 2022-12-25 RX ADMIN — ACETAMINOPHEN 650 MG: 325 TABLET ORAL at 01:37

## 2022-12-25 RX ADMIN — POTASSIUM PHOSPHATE, MONOBASIC AND POTASSIUM PHOSPHATE, DIBASIC 15 MMOL: 224; 236 INJECTION, SOLUTION, CONCENTRATE INTRAVENOUS at 11:48

## 2022-12-25 RX ADMIN — ACETAMINOPHEN 650 MG: 325 TABLET ORAL at 21:27

## 2022-12-25 RX ADMIN — ALUMINUM HYDROXIDE, MAGNESIUM HYDROXIDE, AND DIMETHICONE 15 ML: 400; 400; 40 SUSPENSION ORAL at 21:26

## 2022-12-25 RX ADMIN — LOSARTAN POTASSIUM 100 MG: 50 TABLET, FILM COATED ORAL at 21:15

## 2022-12-25 RX ADMIN — Medication 10 ML: at 09:30

## 2022-12-25 RX ADMIN — Medication 5 MG: at 01:37

## 2022-12-25 RX ADMIN — CALCITRIOL CAPSULES 0.25 MCG 0.25 MCG: 0.25 CAPSULE ORAL at 17:29

## 2022-12-25 RX ADMIN — ALUMINUM HYDROXIDE, MAGNESIUM HYDROXIDE, AND DIMETHICONE 15 ML: 400; 400; 40 SUSPENSION ORAL at 11:48

## 2022-12-25 RX ADMIN — ACETAMINOPHEN 650 MG: 325 TABLET ORAL at 11:48

## 2022-12-25 RX ADMIN — CARVEDILOL 12.5 MG: 12.5 TABLET, FILM COATED ORAL at 09:30

## 2022-12-25 RX ADMIN — NIFEDIPINE 60 MG: 60 TABLET, EXTENDED RELEASE ORAL at 09:30

## 2022-12-25 RX ADMIN — CARVEDILOL 12.5 MG: 12.5 TABLET, FILM COATED ORAL at 21:15

## 2022-12-25 RX ADMIN — ALUMINUM HYDROXIDE, MAGNESIUM HYDROXIDE, AND DIMETHICONE 15 ML: 400; 400; 40 SUSPENSION ORAL at 01:37

## 2022-12-25 RX ADMIN — Medication 10 ML: at 21:15

## 2022-12-25 RX ADMIN — CEFAZOLIN SODIUM 1 G: 1 INJECTION, SOLUTION INTRAVENOUS at 01:33

## 2022-12-25 RX ADMIN — CEFAZOLIN SODIUM 1 G: 1 INJECTION, SOLUTION INTRAVENOUS at 21:15

## 2022-12-25 NOTE — PLAN OF CARE
Goal Outcome Evaluation:  Progress: no change  Outcome Evaluation: VSS with O2 in the mid to upper 90s on room air. Pt's HR continues to be elevated in the 90s. Pt had 1L taken off during his dialysis tx.

## 2022-12-25 NOTE — PROGRESS NOTES
[facility]     Nephrology Progress Note      Patient Name: Ghassan Browning  : 1990  MRN: [id number]  Primary Care Physician:  Paulina Cullen MD  Date of admission: 2022    Subjective   Subjective     Interval History:  Patient Reports feeling better but still weak and tired.  Afebrile. No nausea or vomiting.  Tolerating p.o.   Some intermittent cough, COVID-positive and positive staph bacteremia.    Review of Systems   All systems were reviewed and negative except for: What is mentioned above.    Objective   Objective     Vitals:   Temp:  [98.1 °F (36.7 °C)-99.1 °F (37.3 °C)] 98.6 °F (37 °C)  Heart Rate:  [] 98  Resp:  [18-20] 18  BP: (113-144)/(65-90) 126/74  Physical Exam:      Constitutional: Awake, alert, no distress, conversant and pleasant, sitting in bed.  Intermittent cough.              Eyes: sclerae anicteric, no conjunctival injection              HENT: mucous membranes moist              Neck: Supple, no thyromegaly, no lymphadenopathy, trachea midline, No JVD              Respiratory: Decreased to auscultation bilaterally, nonlabored respirations               Cardiovascular: RRR, no murmurs, rubs, or gallops.              Gastrointestinal: Positive bowel sounds, soft, nontender, nondistended              Musculoskeletal: No edema, no clubbing or cyanosis, right upper extremity AV graft, patent.              Psychiatric: Appropriate affect, cooperative              Neurologic: Oriented x 3, moving all extremities, Cranial Nerves grossly intact, speech clear              Skin: warm and dry, no rashes     Result Review    Result Reviewed:  I have personally reviewed the results from the time of this admission to 2022 13:19 EST and agree with these findings:  [x]  Laboratory  []  Microbiology  [x]  Radiology  []  EKG/Telemetry   []  Cardiology/Vascular   []  Pathology  []  Old records  []  Other:  Lab Results   Component Value Date    GLUCOSE 138 (H) 2022    CALCIUM 7.8  (L) 12/25/2022     (L) 12/25/2022    K 3.6 12/25/2022    CO2 26.5 12/25/2022    CL 91 (L) 12/25/2022    BUN 42 (H) 12/25/2022    CREATININE 6.60 (H) 12/25/2022    BCR 6.4 (L) 12/25/2022    ANIONGAP 13.5 12/25/2022     Lab Results   Component Value Date    CALCIUM 7.8 (L) 12/25/2022    PHOS 0.8 (C) 12/25/2022      Results from last 7 days   Lab Units 12/25/22  0546   MAGNESIUM mg/dL 2.2              Assessment & Plan   Assessment / Plan       Active Hospital Problems:  Active Hospital Problems    Diagnosis    • **Sepsis, due to unspecified organism, unspecified whether acute organ dysfunction present ([facility])    • Pericardial effusion    • ESRD (end stage renal disease) ([facility])        Assessment and Plan:    - End-stage renal disease, dialysis TTS schedule through right upper extremity AV graft.  Continue same schedule.  UF 1-2 kg as tolerated.  - History of SVC syndrome.  - COVID pneumonia, and MSSA bacteremia, on antibiotics per primary.  Source unclear to me.  Had previously had vascular stents.  AV graft seems to be working well.  Site appeared okay.  - Hypertension, with hypotension.  Monitor for now.  - Hyponatremia with hypervolemia, continue 2000 cc p.o. fluid restriction per day and try to correct with dialysis.  - Metabolic acidosis, mild.  Improved after dialysis.  - History of  shunt.  - Large pericardial effusion, evaluated by cardiology.  No tamponade.  45 to 50% EF.  Severe LVH with diastolic dysfunction.  - Hyperphosphatemia, then hypophosphatemia.  Replaced IV yesterday, had dialysis, still low this morning.  Getting additional replacement of phosphorus.  Encourage him to eat more.  Not on low phosphorus diet.  Not on binders at this time.  We will add calcitriol 0.25 mcg 1 dose today then 0.25 mcg on dialysis days per home dosing.   Discussed with nursing staff.    Will follow.      Electronically signed by Reyna Torres MD, 12/22/22, 12:21 PM EST.

## 2022-12-25 NOTE — PROGRESS NOTES
[facility]   Hospitalist Progress Note  Date: 2022  Patient Name: Ghassan Browning  : 1990  MRN: [id number]  Date of admission: 2022  Consultants:   -Nephrology: Dr. Reyna Torres  -Cardiology: Dr. Cirilo May    Subjective   Subjective     Chief Complaint: Fever, chills, muscle aches    Summary:   Ghassan Browning is a 32 y.o. male ESRD on HD (TTS), SVC syndrome s/p stenting and  shunt placement, essential hypertension who presented to ED with 4-day history of worsening fevers, chills, nausea and myalgias.  Eval in ED significant for patient being febrile, tachycardic and labs showing leukocytosis and mildly elevated lactic acid.  CXR showed bilateral infiltrates.  Hospitalist service contacted for further evaluation and management.  CT chest imaging was obtained and showed bilateral infiltrates in addition to moderate to large pericardial effusion.  Nephrology and cardiology consulted.  Cardiology noted that no tamponade evident and no intervention warranted at this time.  Empiric antibiotics were initiated.  Initial blood culture returned positive for MSSA.    Interval Followup:   Patient complains of all over body aches and pains and myalgias  Patient continues to have intermittent cough      Antibiotics:   -Cefazolin    Review of Systems   All systems reviewed and negative except for generalized weakness and myalgias, intermittent cough    Objective   Objective     Vitals:   Temp:  [98.1 °F (36.7 °C)-99.7 °F (37.6 °C)] 98.1 °F (36.7 °C)  Heart Rate:  [] 103  Resp:  [18-20] 18  BP: (113-144)/(59-90) 144/90  Physical Exam   Gen: No acute distress, sitting up in bed. No family at the bedside   HEENT: MMM, Atraumatic  Neck: Supple, Trachea midline,  Resp: clear no w/r/r   Card: RRR, No m/r/g  Abd: Soft, Nontender, Nondistended, + bowel sounds  Ext: full rom , right upper extremity AV graft noted  Neuro: CN II-XII grossly intact, No focal deficits appreciated  Psych: AAO x 3, Normal mood,  Normal affect    Result Review    Result Review:  I have personally reviewed the results as below and agree with these findings:  []  Laboratory:   CMP    CMP 12/23/22 12/24/22 12/25/22   Glucose 113 (A) 118 (A) 138 (A)   BUN 48 (A) 72 (A) 42 (A)   Creatinine 7.22 (A) 9.46 (A) 6.60 (A)   Sodium 132 (A) 130 (A) 131 (A)   Potassium 4.1 3.8 3.6   Chloride 91 (A) 88 (A) 91 (A)   Calcium 8.0 (A) 7.7 (A) 7.8 (A)   Albumin 3.70 3.50 3.40 (A)   Total Bilirubin  0.4 0.3   Alkaline Phosphatase  91 93   AST (SGOT)  16 14   ALT (SGPT)  <5 <5   (A) Abnormal value            CBC    CBC 12/23/22 12/24/22 12/25/22   WBC 13.87 (A) 14.25 (A) 11.87 (A)   RBC 3.49 (A) 3.39 (A) 3.06 (A)   Hemoglobin 10.1 (A) 9.5 (A) 8.6 (A)   Hematocrit 31.7 (A) 29.8 (A) 27.1 (A)   MCV 90.8 87.9 88.6   MCH 28.9 28.0 28.1   MCHC 31.9 31.9 31.7   RDW 18.9 (A) 19.2 (A) 19.5 (A)   Platelets 103 (A) 124 (A) 148   (A) Abnormal value            [x]  Microbiology: Blood culture (12/23/2022): No growth to date  [x]  Radiology: CT neck soft tissue imaging personally reviewed.  Lymphadenopathy noted within the neck.  Soft tissue changes appreciated below patient's jaw where patient has wound, no overt abscess identified  [x]  EKG/Telemetry: NSVT.  Sinus rhythm.  No acute events.  []  Cardiology/Vascular:    []  Pathology:  []  Old records:  []  Other:    Assessment & Plan   Assessment / Plan     Assessment:  MSSA bacteremia  Sepsis secondary to above, present on admission  Large pericardial effusion without cardiac tamponade  Lactic acidosis likely secondary to sepsis  Severe hypophosphatemia  ESRD on hemodialysis  Chronic normocytic anemia  Essential hypertension  Severe protein calorie malnutrition   covid infection     Plan:  -Nephrology and Cardiology consulted and following, appreciate assistance and recommendations in the care of this patient.  -Continue home medications    -Dialysis per nephrology  -Continue cefazolin.  Repeat blood cultures showing no  growth   -CT neck soft tissue imaging reviewed.  No abscess appreciated there is inflammation in the area for possible infectious source.   -Patient is to wear BiPAP with naps and at night  -Hemodialysis per nephrology       DVT Prophylaxis: SCDs  Diet: Renal  Dispo: Home when medically appropriate for discharge  Code Status: Full code     Personally reviewed patients labs and imaging, discussed with patient and nurse at bedside. Discussed case with the following consultants: Nephrology and Cardiology.     Part of this note may be an electronic transcription/translation of spoken language to printed text using the Dragon dictation system.    DVT prophylaxis:  Mechanical DVT prophylaxis orders are present.    CODE STATUS:   Code Status (Patient has no pulse and is not breathing): CPR (Attempt to Resuscitate)  Medical Interventions (Patient has pulse or is breathing): Full Support

## 2022-12-25 NOTE — NURSING NOTE
HD NURSE    PRE TX /50    PRE TX WT    LAST BP ON /70    POST TX WT     UF NET 1000    Pt tolerated tx well vs stable report given to RN.

## 2022-12-26 ENCOUNTER — APPOINTMENT (OUTPATIENT)
Dept: CT IMAGING | Facility: HOSPITAL | Age: 32
DRG: 871 | End: 2022-12-26
Payer: MEDICARE

## 2022-12-26 LAB
ALBUMIN SERPL-MCNC: 3.6 G/DL (ref 3.5–5.2)
ALBUMIN/GLOB SERPL: 0.8 G/DL
ALP SERPL-CCNC: 134 U/L (ref 39–117)
ALT SERPL W P-5'-P-CCNC: <5 U/L (ref 1–41)
ANION GAP SERPL CALCULATED.3IONS-SCNC: 16.5 MMOL/L (ref 5–15)
AST SERPL-CCNC: 17 U/L (ref 1–40)
BILIRUB SERPL-MCNC: 0.3 MG/DL (ref 0–1.2)
BUN SERPL-MCNC: 73 MG/DL (ref 6–20)
BUN/CREAT SERPL: 8.4 (ref 7–25)
CALCIUM SPEC-SCNC: 8.1 MG/DL (ref 8.6–10.5)
CHLORIDE SERPL-SCNC: 90 MMOL/L (ref 98–107)
CO2 SERPL-SCNC: 26.5 MMOL/L (ref 22–29)
CREAT SERPL-MCNC: 8.71 MG/DL (ref 0.76–1.27)
CRP SERPL-MCNC: 17.5 MG/DL (ref 0–0.5)
DEPRECATED RDW RBC AUTO: 67.9 FL (ref 37–54)
EGFRCR SERPLBLD CKD-EPI 2021: 7.6 ML/MIN/1.73
EOSINOPHIL # BLD MANUAL: 0.11 10*3/MM3 (ref 0–0.4)
EOSINOPHIL NFR BLD MANUAL: 1 % (ref 0.3–6.2)
ERYTHROCYTE [DISTWIDTH] IN BLOOD BY AUTOMATED COUNT: 20.6 % (ref 12.3–15.4)
ERYTHROCYTE [SEDIMENTATION RATE] IN BLOOD: >130 MM/HR (ref 0–15)
GLOBULIN UR ELPH-MCNC: 4.5 GM/DL
GLUCOSE SERPL-MCNC: 109 MG/DL (ref 65–99)
HCT VFR BLD AUTO: 28.7 % (ref 37.5–51)
HGB BLD-MCNC: 9 G/DL (ref 13–17.7)
LYMPHOCYTES # BLD MANUAL: 0.89 10*3/MM3 (ref 0.7–3.1)
LYMPHOCYTES NFR BLD MANUAL: 16 % (ref 5–12)
MAGNESIUM SERPL-MCNC: 2.7 MG/DL (ref 1.6–2.6)
MCH RBC QN AUTO: 28.5 PG (ref 26.6–33)
MCHC RBC AUTO-ENTMCNC: 31.4 G/DL (ref 31.5–35.7)
MCV RBC AUTO: 90.8 FL (ref 79–97)
MONOCYTES # BLD: 1.79 10*3/MM3 (ref 0.1–0.9)
NEUTROPHILS # BLD AUTO: 8.38 10*3/MM3 (ref 1.7–7)
NEUTROPHILS NFR BLD MANUAL: 75 % (ref 42.7–76)
PHOSPHATE SERPL-MCNC: 0.7 MG/DL (ref 2.5–4.5)
PLATELET # BLD AUTO: 213 10*3/MM3 (ref 140–450)
PMV BLD AUTO: 11.2 FL (ref 6–12)
POTASSIUM SERPL-SCNC: 4.6 MMOL/L (ref 3.5–5.2)
PROT SERPL-MCNC: 8.1 G/DL (ref 6–8.5)
RBC # BLD AUTO: 3.16 10*6/MM3 (ref 4.14–5.8)
RBC MORPH BLD: NORMAL
SMALL PLATELETS BLD QL SMEAR: ADEQUATE
SODIUM SERPL-SCNC: 133 MMOL/L (ref 136–145)
VARIANT LYMPHS NFR BLD MANUAL: 8 % (ref 19.6–45.3)
WBC MORPH BLD: NORMAL
WBC NRBC COR # BLD: 11.17 10*3/MM3 (ref 3.4–10.8)

## 2022-12-26 PROCEDURE — 80053 COMPREHEN METABOLIC PANEL: CPT | Performed by: FAMILY MEDICINE

## 2022-12-26 PROCEDURE — 99221 1ST HOSP IP/OBS SF/LOW 40: CPT | Performed by: SURGERY

## 2022-12-26 PROCEDURE — 84100 ASSAY OF PHOSPHORUS: CPT | Performed by: FAMILY MEDICINE

## 2022-12-26 PROCEDURE — 83735 ASSAY OF MAGNESIUM: CPT | Performed by: INTERNAL MEDICINE

## 2022-12-26 PROCEDURE — 0 IOPAMIDOL PER 1 ML: Performed by: INTERNAL MEDICINE

## 2022-12-26 PROCEDURE — 71260 CT THORAX DX C+: CPT

## 2022-12-26 PROCEDURE — 99233 SBSQ HOSP IP/OBS HIGH 50: CPT | Performed by: INTERNAL MEDICINE

## 2022-12-26 PROCEDURE — 25010000002 CEFAZOLIN 1-4 GM/50ML-% SOLUTION: Performed by: INTERNAL MEDICINE

## 2022-12-26 PROCEDURE — 85007 BL SMEAR W/DIFF WBC COUNT: CPT | Performed by: FAMILY MEDICINE

## 2022-12-26 PROCEDURE — 86140 C-REACTIVE PROTEIN: CPT | Performed by: INTERNAL MEDICINE

## 2022-12-26 PROCEDURE — 25010000002 HEPARIN (PORCINE) PER 1000 UNITS: Performed by: INTERNAL MEDICINE

## 2022-12-26 PROCEDURE — 87040 BLOOD CULTURE FOR BACTERIA: CPT | Performed by: INTERNAL MEDICINE

## 2022-12-26 PROCEDURE — 85025 COMPLETE CBC W/AUTO DIFF WBC: CPT | Performed by: FAMILY MEDICINE

## 2022-12-26 PROCEDURE — 85652 RBC SED RATE AUTOMATED: CPT | Performed by: INTERNAL MEDICINE

## 2022-12-26 RX ORDER — TRAMADOL HYDROCHLORIDE 50 MG/1
25 TABLET ORAL EVERY 12 HOURS PRN
Status: DISCONTINUED | OUTPATIENT
Start: 2022-12-26 | End: 2022-12-27

## 2022-12-26 RX ORDER — SODIUM PHOSPHATE IN D5W 15MMOL/250
15 PLASTIC BAG, INJECTION (ML) INTRAVENOUS EVERY 4 HOURS
Status: COMPLETED | OUTPATIENT
Start: 2022-12-26 | End: 2022-12-26

## 2022-12-26 RX ORDER — HEPARIN SODIUM 5000 [USP'U]/ML
5000 INJECTION, SOLUTION INTRAVENOUS; SUBCUTANEOUS EVERY 8 HOURS SCHEDULED
Status: DISCONTINUED | OUTPATIENT
Start: 2022-12-26 | End: 2023-01-01 | Stop reason: HOSPADM

## 2022-12-26 RX ADMIN — POTASSIUM & SODIUM PHOSPHATES POWDER PACK 280-160-250 MG 1 PACKET: 280-160-250 PACK at 12:38

## 2022-12-26 RX ADMIN — SODIUM PHOSPHATE, MONOBASIC, MONOHYDRATE 15 MMOL: 276; 142 INJECTION, SOLUTION INTRAVENOUS at 10:47

## 2022-12-26 RX ADMIN — TRAMADOL HYDROCHLORIDE 25 MG: 50 TABLET, COATED ORAL at 19:42

## 2022-12-26 RX ADMIN — NIFEDIPINE 60 MG: 60 TABLET, EXTENDED RELEASE ORAL at 08:21

## 2022-12-26 RX ADMIN — LOSARTAN POTASSIUM 100 MG: 50 TABLET, FILM COATED ORAL at 20:15

## 2022-12-26 RX ADMIN — CARVEDILOL 12.5 MG: 12.5 TABLET, FILM COATED ORAL at 20:15

## 2022-12-26 RX ADMIN — Medication 10 ML: at 08:22

## 2022-12-26 RX ADMIN — CARVEDILOL 12.5 MG: 12.5 TABLET, FILM COATED ORAL at 08:21

## 2022-12-26 RX ADMIN — POTASSIUM & SODIUM PHOSPHATES POWDER PACK 280-160-250 MG 1 PACKET: 280-160-250 PACK at 20:15

## 2022-12-26 RX ADMIN — ALUMINUM HYDROXIDE, MAGNESIUM HYDROXIDE, AND DIMETHICONE 15 ML: 400; 400; 40 SUSPENSION ORAL at 08:21

## 2022-12-26 RX ADMIN — CEFAZOLIN SODIUM 1 G: 1 INJECTION, SOLUTION INTRAVENOUS at 21:42

## 2022-12-26 RX ADMIN — Medication 10 ML: at 20:16

## 2022-12-26 RX ADMIN — HEPARIN SODIUM 5000 UNITS: 5000 INJECTION INTRAVENOUS; SUBCUTANEOUS at 14:56

## 2022-12-26 RX ADMIN — IOPAMIDOL 100 ML: 755 INJECTION, SOLUTION INTRAVENOUS at 18:18

## 2022-12-26 RX ADMIN — POTASSIUM & SODIUM PHOSPHATES POWDER PACK 280-160-250 MG 1 PACKET: 280-160-250 PACK at 18:25

## 2022-12-26 RX ADMIN — ALUMINUM HYDROXIDE, MAGNESIUM HYDROXIDE, AND DIMETHICONE 15 ML: 400; 400; 40 SUSPENSION ORAL at 20:15

## 2022-12-26 RX ADMIN — ACETAMINOPHEN 650 MG: 325 TABLET ORAL at 08:30

## 2022-12-26 RX ADMIN — SODIUM PHOSPHATE, MONOBASIC, MONOHYDRATE 15 MMOL: 276; 142 INJECTION, SOLUTION INTRAVENOUS at 23:04

## 2022-12-26 RX ADMIN — HEPARIN SODIUM 5000 UNITS: 5000 INJECTION INTRAVENOUS; SUBCUTANEOUS at 21:42

## 2022-12-26 RX ADMIN — SODIUM PHOSPHATE, MONOBASIC, MONOHYDRATE 15 MMOL: 276; 142 INJECTION, SOLUTION INTRAVENOUS at 18:25

## 2022-12-26 NOTE — CONSULTS
"Nutrition Services    Patient Name: Ghassan Browning  YOB: 1990  MRN: [id number]  Admission date: 12/21/2022      CLINICAL NUTRITION ASSESSMENT      Reason for Assessment  BMI     H&P:    Past Medical History:   Diagnosis Date   • Bronchitis    • Chronic pain    • Contusion, hip    • ESRD on dialysis ([facility])    • Headache    • History of brain shunt    • Hypertension    • Kidney disease    • Sleep apnea    • Wrist sprain         Current Problems:   Active Hospital Problems    Diagnosis    • **Sepsis, due to unspecified organism, unspecified whether acute organ dysfunction present ([facility])    • Pericardial effusion    • ESRD (end stage renal disease) ([facility])         Nutrition/Diet History         Narrative     RD assessment triggered for low BMI in setting on ESRD on HD and previous dx PCM. Pt currently Covid (+). Pt with variable intake ranging %. No weight history available to assess weight changes. Pt was unavailable today, at dialysis. ONS already ordered TID w/ meals, will adjust to renal supplement. RD will follow up for full assessment.        Anthropometrics        Current Height, Weight Height: 162.6 cm (64\")  Weight: 49 kg (108 lb 0.4 oz)   Current BMI Body mass index is 18.54 kg/m².       Weight Hx  Wt Readings from Last 30 Encounters:   12/24/22 0950 49 kg (108 lb 0.4 oz)   12/21/22 0511 52.2 kg (115 lb 1.3 oz)   12/21/22 0141 52.6 kg (115 lb 15.4 oz)            Wt Change Observation WINIFRED     Estimated/Assessed Needs       Energy Requirements 35 kcal/kg   EST Needs (kcal/day) 1715       Protein Requirements 1-1.2 g/kg   EST Daily Needs (g/day) 49-59       Fluid Requirements     Estimated Needs (mL/day) 2000 mL/d fluid restriction     Labs/Medications         Pertinent Labs Reviewed.   Results from last 7 days   Lab Units 12/26/22  0803 12/25/22  0546 12/24/22  0620   SODIUM mmol/L 133* 131* 130*   POTASSIUM mmol/L 4.6 3.6 3.8   CHLORIDE mmol/L 90* 91* 88*   CO2 mmol/L 26.5 26.5 26.6   BUN " mg/dL 73* 42* 72*   CREATININE mg/dL 8.71* 6.60* 9.46*   CALCIUM mg/dL 8.1* 7.8* 7.7*   BILIRUBIN mg/dL 0.3 0.3 0.4   ALK PHOS U/L 134* 93 91   ALT (SGPT) U/L <5 <5 <5   AST (SGOT) U/L 17 14 16   GLUCOSE mg/dL 109* 138* 118*     Results from last 7 days   Lab Units 12/26/22  0803 12/25/22  0546 12/24/22  0621 12/24/22  0620   MAGNESIUM mg/dL 2.7* 2.2  --  2.6   PHOSPHORUS mg/dL 0.7* 0.8*  --  0.6*   HEMOGLOBIN g/dL 9.0* 8.6*   < >  --    HEMATOCRIT % 28.7* 27.1*   < >  --     < > = values in this interval not displayed.     COVID19   Date Value Ref Range Status   12/20/2022 Detected (C) Not Detected - Ref. Range Final     Lab Results   Component Value Date    HGBA1C 5.0 11/26/2021         Pertinent Medications Reviewed.     Current Nutrition Orders & Evaluation of Intake       Oral Nutrition     Current PO Diet Diet: Renal Diets, Fluid Restriction (240 mL/tray) Diets; Low Potassium; 2000 mL/day; Texture: Regular Texture (IDDSI 7); Fluid Consistency: Thin (IDDSI 0)   Supplement Orders Placed This Encounter      Dietary Nutrition Supplements Boost Plus (Ensure Plus); strawberry       Malnutrition Severity Assessment                Nutrition Diagnosis         Nutrition Dx Problem 1 Increased nutrient needs related to endocrine dysfunction and catabolic illness as evidenced by ESRD on HD, Covid (+), hx PCM, and BMI 18.5.       Nutrition Intervention         Change ONS to [facility] Renal po TID w/ meals     Medical Nutrition Therapy/Nutrition Education          Learner     Readiness N/A  N/A     Method     Response N/A  N/A     Monitor/Evaluation        Monitor PO intake, Supplement intake, Pertinent labs, Weight       Nutrition Discharge Plan         To be determined       Electronically signed by:  Padmini Canela RD  12/26/22 11:59 EST

## 2022-12-26 NOTE — PROGRESS NOTES
[facility]     Nephrology Progress Note      Patient Name: Ghassan Browning  : 1990  MRN: [id number]  Primary Care Physician:  Paulina Cullen MD  Date of admission: 2022    Subjective   Subjective     Interval History:  Patient Reports feeling better but still weak and tired.   Having some pain in access arm. No nausea or vomiting.  Tolerating p.o.   Some intermittent cough, COVID-positive and positive staph bacteremia.    Review of Systems   All systems were reviewed and negative except for: What is mentioned above.    Objective   Objective     Vitals:   Temp:  [98.2 °F (36.8 °C)-101.1 °F (38.4 °C)] 98.2 °F (36.8 °C)  Heart Rate:  [] 91  Resp:  [18-20] 20  BP: (114-130)/(56-72) 124/72  Physical Exam:      Constitutional: Awake, alert, no distress, conversant and pleasant, sitting in bed.  Intermittent cough.              Eyes: sclerae anicteric, no conjunctival injection              HENT: mucous membranes moist              Neck: Supple, no thyromegaly, no lymphadenopathy, trachea midline, No JVD              Respiratory: Decreased to auscultation bilaterally, nonlabored respirations               Cardiovascular: RRR, no murmurs, rubs, or gallops.              Gastrointestinal: Positive bowel sounds, soft, nontender, nondistended              Musculoskeletal: No edema, no clubbing or cyanosis, right upper extremity AV graft, patent.              Psychiatric: Appropriate affect, cooperative              Neurologic: Oriented x 3, moving all extremities, Cranial Nerves grossly intact, speech clear              Skin: warm and dry, no rashes     Result Review    Result Reviewed:  I have personally reviewed the results from the time of this admission to 2022 12:09 EST and agree with these findings:  [x]  Laboratory  []  Microbiology  [x]  Radiology  []  EKG/Telemetry   []  Cardiology/Vascular   []  Pathology  []  Old records  []  Other:  Lab Results   Component Value Date    GLUCOSE 109 (H)  12/26/2022    CALCIUM 8.1 (L) 12/26/2022     (L) 12/26/2022    K 4.6 12/26/2022    CO2 26.5 12/26/2022    CL 90 (L) 12/26/2022    BUN 73 (H) 12/26/2022    CREATININE 8.71 (H) 12/26/2022    BCR 8.4 12/26/2022    ANIONGAP 16.5 (H) 12/26/2022     Lab Results   Component Value Date    CALCIUM 8.1 (L) 12/26/2022    PHOS 0.7 (C) 12/26/2022      Results from last 7 days   Lab Units 12/26/22  0803   MAGNESIUM mg/dL 2.7*              Assessment & Plan   Assessment / Plan       Active Hospital Problems:  Active Hospital Problems    Diagnosis    • **Sepsis, due to unspecified organism, unspecified whether acute organ dysfunction present ([facility])    • Pericardial effusion    • ESRD (end stage renal disease) ([facility])        Assessment and Plan:    - End-stage renal disease, dialysis TTS schedule through right upper extremity AV graft.  Continue same schedule.  UF 1-2 kg as tolerated.  - History of SVC syndrome.  - COVID pneumonia, and MSSA bacteremia, on antibiotics per primary.  Source unclear to me.  Had previously had vascular stents.  AV graft seems to be working well.  Site appeared okay.  - Hypertension, with hypotension.  Monitor for now.  - Hyponatremia with hypervolemia, continue 2000 cc p.o. fluid restriction per day and try to correct with dialysis.  - Metabolic acidosis, mild.  Improved after dialysis.  - History of  shunt.  - Large pericardial effusion, evaluated by cardiology.  No tamponade.  45 to 50% EF.  Severe LVH with diastolic dysfunction.  - Hyperphosphatemia, then hypophosphatemia.  Replaced IV yesterday, had dialysis, still low this morning.  Getting additional replacement of phosphorus.  Not on low phosphorus diet.  Continue calcitriol 0.25 mcg on dialysis days per home dosing.

## 2022-12-26 NOTE — PROGRESS NOTES
[facility]   Hospitalist Progress Note    Date of admission: 12/21/2022  Patient Name: Ghassan Browning  1990  Date: 12/26/2022      Subjective     Chief Complaint   Patient presents with   • Fever   • Generalized Body Aches   • Dizziness       Summary: 32 y.o. with ESRD on Tuesday Thursday Saturday hemodialysis, history of SVC syndrome with prior stenting, hydrocephalus with prior  shunt, presented with 4 days of worsening fevers and chills, found to have MSSA bacteremia, large pericardial effusion, COVID-19 infection on 12/20.    Interval Followup: Febrile overnight.  Patient notes having some abscess and puslike drainage from his right upper extremity AV fistula earlier today in the shower.  Having complaints of more swelling in his right upper extremity radiating to his back/shoulder.  Notes some pain where he previously had issues with his SVC in the past.  Denies any neck pain or jaw or throat swelling or tenderness.  Discussed with patient further imaging evaluation for clots and infection.    Review of Systems  No chest pain or palpitations  No fevers or chills    Objective     Vitals:   Temp:  [98.4 °F (36.9 °C)-101.1 °F (38.4 °C)] 101.1 °F (38.4 °C)  Heart Rate:  [] 102  Resp:  [18-20] 20  BP: (114-130)/(56-74) 130/69    Physical Exam   Gen: awake, resting in bed, conversant, tired and frail appearing, appears older than stated age  HENT: NCAT, mmm, no acute tenderness and neck or submental area  Resp: CTA B, no acute wheezes rhonchi's or rales noted  CV: RRR, fistula related to murmur auscultated, no LE pitting edema  GI: Abdomen soft, NT, ND, no guarding, +BS  Psych: appropriate mood and affect, aox3  Skin: warm, dry, AV fistula on the right upper extremity tender somewhat nonspecifically throughout, small superficial lesion that patient states the site of recent purulent drainage no obvious cellulitis, erythema noted on my evaluation     Result Review:  Vital signs, labs and recent  relevant imaging reviewed.      !Patient Home Medications Stored in Pharmacy, , Does not apply, BID  [START ON 12/27/2022] calcitriol, 0.25 mcg, Oral, Every Other Day  carvedilol, 12.5 mg, Oral, BID  ceFAZolin, 1 g, Intravenous, Q24H  lidocaine-prilocaine, 1 application, Topical, Once per day on Mon Wed Fri  losartan, 100 mg, Oral, Nightly  NIFEdipine XL, 60 mg, Oral, Daily  sodium chloride, 10 mL, Intravenous, Q12H        •  acetaminophen  •  aluminum-magnesium hydroxide-simethicone  •  melatonin  •  nitroglycerin  •  sodium chloride  •  sodium chloride  •  sodium chloride      Assessment / Plan     Assessment/Plan:  MSSA bacteremia  Sepsis secondary to MSSA bacteremia, question pneumonia for original source  COVID-19 pneumonia  Diastolic CHF with borderline reduced EF 46 to 50%, grade 2 diastolic dysfunction  Large, greater than 2 cm circumferential pericardial effusion without tamponade  Hydrocephalus with  shunt  History of SVC syndrome with prior stenting approximately 2016  ESRD on hemodialysis  Chronic normocytic anemia  Hypertension  Severe protein calorie malnutrition  Lactic acidosis, clinically significant  BRIAN on BiPAP  GERD  Severe hypophosphatemia    -IV cefazolin for MSSA bacteremia, repeat blood cultures given fever, 2D echo was negative for vegetation, blood cultures had cleared.  Repeat blood cultures ordered today given fever, pending results may need to evaluate with ABRIL.  Discussed with infectious disease further based on results of additional testing  - Had CT soft tissue neck with some lymphadenopathy, also at risk for additional nidus given patient's  shunt.    -Will check a right upper extremity AV fistula ultrasound possible abscess or flow related issues, also check CT chest with arterial and delayed imaging to look for clots and further SVC related issues.  Discussed with Dr. Sofia/vascular surgery, appreciate assistance   -Continues with yesterday hemodialysis as per nephrology,  appreciate assistance  - Monitor replace electrolytes, critical hypophosphatemia, no IV and p.o. repletion ordered  - Continue Coreg nifedipine and losartan, monitor blood pressure  - Continue to monitor labs    Inpatient monitoring and treatment.    DVT prophylaxis: Start on heparin DVT prophylaxis     Code Status (Patient has no pulse and is not breathing): CPR (Attempt to Resuscitate)  Medical Interventions (Patient has pulse or is breathing): Full Support        CBC    CBC 12/23/22 12/24/22 12/25/22   WBC 13.87 (A) 14.25 (A) 11.87 (A)   RBC 3.49 (A) 3.39 (A) 3.06 (A)   Hemoglobin 10.1 (A) 9.5 (A) 8.6 (A)   Hematocrit 31.7 (A) 29.8 (A) 27.1 (A)   MCV 90.8 87.9 88.6   MCH 28.9 28.0 28.1   MCHC 31.9 31.9 31.7   RDW 18.9 (A) 19.2 (A) 19.5 (A)   Platelets 103 (A) 124 (A) 148   (A) Abnormal value              CMP    CMP 12/23/22 12/24/22 12/25/22   Glucose 113 (A) 118 (A) 138 (A)   BUN 48 (A) 72 (A) 42 (A)   Creatinine 7.22 (A) 9.46 (A) 6.60 (A)   Sodium 132 (A) 130 (A) 131 (A)   Potassium 4.1 3.8 3.6   Chloride 91 (A) 88 (A) 91 (A)   Calcium 8.0 (A) 7.7 (A) 7.8 (A)   Albumin 3.70 3.50 3.40 (A)   Total Bilirubin  0.4 0.3   Alkaline Phosphatase  91 93   AST (SGOT)  16 14   ALT (SGPT)  <5 <5   (A) Abnormal value              Dispo: pending stability in clinical condition and appropriate discharge location.

## 2022-12-26 NOTE — PLAN OF CARE
Goal Outcome Evaluation:  Plan of Care Reviewed With: patient           Outcome Evaluation: pt is due for dialysis this am. Congested productive cough noted. states he is having alot of indigestion. medicated per emar...

## 2022-12-26 NOTE — CONSULTS
[facility]   VASCULAR SURGERY CONSULT    Patient Name: Ghassan Browning  : 1990  MRN: [id number]  Primary Care Physician:  Paulina Cullen MD  Date of admission: 2022    Subjective   Subjective     Chief Complaint: Bacteremia with concern for fistula infection.    HPI:    Ghassan Browning is a 32 y.o. male admitted because of COVID-pneumonia and bacteremia.  The source for this is unclear.  I reviewed his chart but did not see the patient because of his current active COVID-pneumonia.  He has a right upper extremity brachiocephalic fistula with prior interventions.  He also has a covered stent graft in his brachiocephalic/subclavian vein on the right side that was placed about 5 years ago for central venous stenosis.  There is concern for the infection source to be related to his dialysis access.     Review of Systems   Unable to obtain due to patient's current condition  Personal History     Past Medical History:   Diagnosis Date   • Bronchitis    • Chronic pain    • Contusion, hip    • ESRD on dialysis (HCC)    • Headache    • History of brain shunt    • Hypertension    • Kidney disease    • Sleep apnea    • Wrist sprain        Past Surgical History:   Procedure Laterality Date   • CSF SHUNT Right        Family History: family history is not on file. Otherwise pertinent FHx was reviewed and not pertinent to current issue.    Social History:  reports that he quit smoking about 2 weeks ago. His smoking use included cigarettes. He smoked an average of .5 packs per day. He does not have any smokeless tobacco history on file. He reports that he does not drink alcohol and does not use drugs.    Home Medications:  NIFEdipine XL, Marielena-Ismael, calcium acetate, calcium carbonate, carvedilol, ergocalciferol, and losartan    Allergies:  Allergies   Allergen Reactions   • Methylene Blue Anaphylaxis and Hives     Throat closes/ swelling    • Ace Inhibitors    • Latex    • Penicillins        Objective   Objective      Vitals:   Temp:  [97.6 °F (36.4 °C)-101.1 °F (38.4 °C)] 97.6 °F (36.4 °C)  Heart Rate:  [] 92  Resp:  [18-20] 18  BP: (115-135)/(56-72) 135/56    Physical Exam   Deferred due to current COVID-pneumonia status.  I reviewed the image of his right arm AV fistula that is in the chart.  There is some induration that is obvious.  No evidence of any bleeding or draining sinus.  There is some concern of purulent drainage at some point but not as obvious on the image.    Diagnostic studies: Pending    Labs:      Results from last 7 days   Lab Units 12/26/22  0803 12/25/22  0546 12/24/22  0621   WBC 10*3/mm3 11.17* 11.87* 14.25*   HEMOGLOBIN g/dL 9.0* 8.6* 9.5*   HEMATOCRIT % 28.7* 27.1* 29.8*   PLATELETS 10*3/mm3 213 148 124*         Results from last 7 days   Lab Units 12/26/22  0803 12/25/22  0546 12/24/22  0620   CREATININE mg/dL 8.71* 6.60* 9.46*                  No results found for: LDL      Results from last 7 days   Lab Units 12/26/22  0803   CRP mg/dL 17.50*        Assessment & Plan   Assessment / Plan     Active Hospital Problems:  Active Hospital Problems    Diagnosis    • **Sepsis, due to unspecified organism, unspecified whether acute organ dysfunction present (HCC)    • Pericardial effusion    • ESRD (end stage renal disease) ([facility])        Assessment/plan:   32-year-old male with concern for dialysis access infection.  He does have a covered stent graft in his brachiocephalic vein also.  We will get a CT with arterial and venous phase and also an ultrasound to see if there is any fluid collection or abscess around the fistula or any stranding in the perivenous area.  We will decide further steps based on that.        Electronically signed by Royce Sofia MD, 12/26/22, 3:45 PM EST.

## 2022-12-26 NOTE — PLAN OF CARE
Goal Outcome Evaluation:   Pt complaining of right shoulder pain- md aware. Pt states fistula is draining. Vasc consult. Picture uploaded in chart. Ct and doppler ordered.

## 2022-12-27 ENCOUNTER — APPOINTMENT (OUTPATIENT)
Dept: CARDIOLOGY | Facility: HOSPITAL | Age: 32
DRG: 871 | End: 2022-12-27
Payer: MEDICARE

## 2022-12-27 LAB
ALBUMIN SERPL-MCNC: 3.3 G/DL (ref 3.5–5.2)
ALBUMIN/GLOB SERPL: 0.8 G/DL
ALP SERPL-CCNC: 96 U/L (ref 39–117)
ALT SERPL W P-5'-P-CCNC: <5 U/L (ref 1–41)
ANION GAP SERPL CALCULATED.3IONS-SCNC: 16.4 MMOL/L (ref 5–15)
AST SERPL-CCNC: 16 U/L (ref 1–40)
BASOPHILS # BLD AUTO: 0.05 10*3/MM3 (ref 0–0.2)
BASOPHILS NFR BLD AUTO: 0.5 % (ref 0–1.5)
BH CV VAS DIALYSIS ARTERIAL ANASTOMOSIS DIAMETER: 0.97 CM
BH CV VAS DIALYSIS ARTERIAL ANASTOMOSIS EDV: 129 CM/SEC
BH CV VAS DIALYSIS ARTERIAL ANASTOMOSIS PSV: 260 CM/SEC
BH CV VAS DIALYSIS CONDUIT DIST DEPTH: 0.7 CM
BH CV VAS DIALYSIS CONDUIT DIST DIAMETER: 0.72 CM
BH CV VAS DIALYSIS CONDUIT DIST EDV: 191 CM/SEC
BH CV VAS DIALYSIS CONDUIT DIST FLOW VOL: 928 ML/MIN
BH CV VAS DIALYSIS CONDUIT DIST PSV: 294 CM/SEC
BH CV VAS DIALYSIS CONDUIT MID DEPTH: 0.54 CM
BH CV VAS DIALYSIS CONDUIT MID DIAMETER: 0.79 CM
BH CV VAS DIALYSIS CONDUIT MID EDV: 195 CM/SEC
BH CV VAS DIALYSIS CONDUIT MID FLOW VOL: 581 ML/MIN
BH CV VAS DIALYSIS CONDUIT MID PSV: 303 CM/SEC
BH CV VAS DIALYSIS CONDUIT MID/DIST DEPTH: 0.61 CM
BH CV VAS DIALYSIS CONDUIT MID/DIST EDV: 167 CM/SEC
BH CV VAS DIALYSIS CONDUIT MID/DIST PSV: 270 CM/SEC
BH CV VAS DIALYSIS CONDUIT PROX DEPTH: 1.65 CM
BH CV VAS DIALYSIS CONDUIT PROX DIAMETER: 1.13 CM
BH CV VAS DIALYSIS CONDUIT PROX EDV: 94 CM/SEC
BH CV VAS DIALYSIS CONDUIT PROX FLOW VOL: 950 ML/MIN
BH CV VAS DIALYSIS CONDUIT PROX PSV: 196 CM/SEC
BH CV VAS DIALYSIS CONDUIT PROX/MID DEPTH: 0.84 CM
BH CV VAS DIALYSIS CONDUIT PROX/MID EDV: 389 CM/SEC
BH CV VAS DIALYSIS CONDUIT PROX/MID PSV: 593 CM/SEC
BH CV VAS DIALYSIS PRE-INFLOW BRACHIAL DIAMETER: 0.95 CM
BH CV VAS DIALYSIS PRE-INFLOW BRACHIAL EDV: 53 CM/SEC
BH CV VAS DIALYSIS PRE-INFLOW BRACHIAL FLOW VOL: 460 ML/MIN
BH CV VAS DIALYSIS PRE-INFLOW BRACHIAL PSV: 99 CM/SEC
BH CV VAS DIALYSIS VENOUS OUTFLOW AXILLARY DIAMETER: 0.56 CM
BH CV VAS DIALYSIS VENOUS OUTFLOW AXILLARY EDV: 346 CM/SEC
BH CV VAS DIALYSIS VENOUS OUTFLOW AXILLARY PSV: 486 CM/SEC
BILIRUB SERPL-MCNC: 0.3 MG/DL (ref 0–1.2)
BUN SERPL-MCNC: 85 MG/DL (ref 6–20)
BUN/CREAT SERPL: 8.5 (ref 7–25)
CALCIUM SPEC-SCNC: 6.8 MG/DL (ref 8.6–10.5)
CHLORIDE SERPL-SCNC: 85 MMOL/L (ref 98–107)
CO2 SERPL-SCNC: 24.6 MMOL/L (ref 22–29)
CREAT SERPL-MCNC: 10 MG/DL (ref 0.76–1.27)
DEPRECATED RDW RBC AUTO: 68 FL (ref 37–54)
EGFRCR SERPLBLD CKD-EPI 2021: 6.4 ML/MIN/1.73
EOSINOPHIL # BLD AUTO: 0.43 10*3/MM3 (ref 0–0.4)
EOSINOPHIL NFR BLD AUTO: 4 % (ref 0.3–6.2)
ERYTHROCYTE [DISTWIDTH] IN BLOOD BY AUTOMATED COUNT: 20.4 % (ref 12.3–15.4)
FERRITIN SERPL-MCNC: 1181 NG/ML (ref 30–400)
GLOBULIN UR ELPH-MCNC: 4.4 GM/DL
GLUCOSE SERPL-MCNC: 146 MG/DL (ref 65–99)
HCT VFR BLD AUTO: 24.1 % (ref 37.5–51)
HGB BLD-MCNC: 7.6 G/DL (ref 13–17.7)
IMM GRANULOCYTES # BLD AUTO: 0.11 10*3/MM3 (ref 0–0.05)
IMM GRANULOCYTES NFR BLD AUTO: 1 % (ref 0–0.5)
IRON 24H UR-MRATE: 29 MCG/DL (ref 59–158)
IRON SATN MFR SERPL: 13 % (ref 20–50)
LYMPHOCYTES # BLD AUTO: 1.42 10*3/MM3 (ref 0.7–3.1)
LYMPHOCYTES NFR BLD AUTO: 13.1 % (ref 19.6–45.3)
MAGNESIUM SERPL-MCNC: 2.6 MG/DL (ref 1.6–2.6)
MAXIMAL PREDICTED HEART RATE: 187 BPM
MCH RBC QN AUTO: 28.5 PG (ref 26.6–33)
MCHC RBC AUTO-ENTMCNC: 31.5 G/DL (ref 31.5–35.7)
MCV RBC AUTO: 90.3 FL (ref 79–97)
MONOCYTES # BLD AUTO: 2.06 10*3/MM3 (ref 0.1–0.9)
MONOCYTES NFR BLD AUTO: 19 % (ref 5–12)
NEUTROPHILS NFR BLD AUTO: 6.76 10*3/MM3 (ref 1.7–7)
NEUTROPHILS NFR BLD AUTO: 62.4 % (ref 42.7–76)
NRBC BLD AUTO-RTO: 0 /100 WBC (ref 0–0.2)
PHOSPHATE SERPL-MCNC: 3.1 MG/DL (ref 2.5–4.5)
PLATELET # BLD AUTO: 255 10*3/MM3 (ref 140–450)
PMV BLD AUTO: 11.3 FL (ref 6–12)
POTASSIUM SERPL-SCNC: 4.6 MMOL/L (ref 3.5–5.2)
PROT SERPL-MCNC: 7.7 G/DL (ref 6–8.5)
RBC # BLD AUTO: 2.67 10*6/MM3 (ref 4.14–5.8)
RETICS # AUTO: 0.05 10*6/MM3 (ref 0.02–0.13)
RETICS/RBC NFR AUTO: 1.77 % (ref 0.7–1.9)
SODIUM SERPL-SCNC: 126 MMOL/L (ref 136–145)
STRESS TARGET HR: 159 BPM
TIBC SERPL-MCNC: 225 MCG/DL (ref 298–536)
TRANSFERRIN SERPL-MCNC: 151 MG/DL (ref 200–360)
WBC NRBC COR # BLD: 10.83 10*3/MM3 (ref 3.4–10.8)

## 2022-12-27 PROCEDURE — 87040 BLOOD CULTURE FOR BACTERIA: CPT | Performed by: INTERNAL MEDICINE

## 2022-12-27 PROCEDURE — 84100 ASSAY OF PHOSPHORUS: CPT | Performed by: FAMILY MEDICINE

## 2022-12-27 PROCEDURE — 85025 COMPLETE CBC W/AUTO DIFF WBC: CPT | Performed by: FAMILY MEDICINE

## 2022-12-27 PROCEDURE — 85045 AUTOMATED RETICULOCYTE COUNT: CPT | Performed by: INTERNAL MEDICINE

## 2022-12-27 PROCEDURE — 25010000002 HEPARIN (PORCINE) PER 1000 UNITS: Performed by: INTERNAL MEDICINE

## 2022-12-27 PROCEDURE — 80053 COMPREHEN METABOLIC PANEL: CPT | Performed by: FAMILY MEDICINE

## 2022-12-27 PROCEDURE — 99233 SBSQ HOSP IP/OBS HIGH 50: CPT | Performed by: INTERNAL MEDICINE

## 2022-12-27 PROCEDURE — 83735 ASSAY OF MAGNESIUM: CPT | Performed by: INTERNAL MEDICINE

## 2022-12-27 PROCEDURE — 25010000002 CEFAZOLIN 1-4 GM/50ML-% SOLUTION: Performed by: INTERNAL MEDICINE

## 2022-12-27 PROCEDURE — 93990 DOPPLER FLOW TESTING: CPT

## 2022-12-27 PROCEDURE — 84466 ASSAY OF TRANSFERRIN: CPT | Performed by: INTERNAL MEDICINE

## 2022-12-27 PROCEDURE — 93990 DOPPLER FLOW TESTING: CPT | Performed by: SURGERY

## 2022-12-27 PROCEDURE — 82728 ASSAY OF FERRITIN: CPT | Performed by: INTERNAL MEDICINE

## 2022-12-27 PROCEDURE — 83540 ASSAY OF IRON: CPT | Performed by: INTERNAL MEDICINE

## 2022-12-27 RX ORDER — POLYETHYLENE GLYCOL 3350 17 G/17G
17 POWDER, FOR SOLUTION ORAL 2 TIMES DAILY PRN
Status: DISCONTINUED | OUTPATIENT
Start: 2022-12-27 | End: 2023-01-01 | Stop reason: HOSPADM

## 2022-12-27 RX ORDER — POLYETHYLENE GLYCOL 3350 17 G/17G
17 POWDER, FOR SOLUTION ORAL 2 TIMES DAILY
Status: DISCONTINUED | OUTPATIENT
Start: 2022-12-27 | End: 2023-01-01 | Stop reason: HOSPADM

## 2022-12-27 RX ORDER — HYDROCODONE BITARTRATE AND ACETAMINOPHEN 5; 325 MG/1; MG/1
1 TABLET ORAL 2 TIMES DAILY PRN
Status: DISCONTINUED | OUTPATIENT
Start: 2022-12-27 | End: 2023-01-01 | Stop reason: HOSPADM

## 2022-12-27 RX ORDER — FERROUS SULFATE 325(65) MG
325 TABLET ORAL
Status: DISCONTINUED | OUTPATIENT
Start: 2022-12-28 | End: 2023-01-01 | Stop reason: HOSPADM

## 2022-12-27 RX ADMIN — ALUMINUM HYDROXIDE, MAGNESIUM HYDROXIDE, AND DIMETHICONE 15 ML: 400; 400; 40 SUSPENSION ORAL at 16:08

## 2022-12-27 RX ADMIN — CARVEDILOL 12.5 MG: 12.5 TABLET, FILM COATED ORAL at 20:31

## 2022-12-27 RX ADMIN — LOSARTAN POTASSIUM 100 MG: 50 TABLET, FILM COATED ORAL at 20:31

## 2022-12-27 RX ADMIN — CEFAZOLIN SODIUM 1 G: 1 INJECTION, SOLUTION INTRAVENOUS at 20:31

## 2022-12-27 RX ADMIN — TRAMADOL HYDROCHLORIDE 25 MG: 50 TABLET, COATED ORAL at 12:27

## 2022-12-27 RX ADMIN — HYDROCODONE BITARTRATE AND ACETAMINOPHEN 1 TABLET: 5; 325 TABLET ORAL at 16:38

## 2022-12-27 RX ADMIN — ACETAMINOPHEN 650 MG: 325 TABLET ORAL at 00:59

## 2022-12-27 RX ADMIN — CARVEDILOL 12.5 MG: 12.5 TABLET, FILM COATED ORAL at 08:02

## 2022-12-27 RX ADMIN — CALCITRIOL CAPSULES 0.25 MCG 0.25 MCG: 0.25 CAPSULE ORAL at 08:02

## 2022-12-27 RX ADMIN — ACETAMINOPHEN 650 MG: 325 TABLET ORAL at 16:04

## 2022-12-27 RX ADMIN — HEPARIN SODIUM 5000 UNITS: 5000 INJECTION INTRAVENOUS; SUBCUTANEOUS at 05:15

## 2022-12-27 RX ADMIN — HEPARIN SODIUM 5000 UNITS: 5000 INJECTION INTRAVENOUS; SUBCUTANEOUS at 14:27

## 2022-12-27 RX ADMIN — Medication 10 ML: at 08:02

## 2022-12-27 RX ADMIN — Medication 10 ML: at 20:31

## 2022-12-27 NOTE — PROGRESS NOTES
[facility]   Hospitalist Progress Note    Date of admission: 12/21/2022  Patient Name: Ghassan Browning  1990  Date: 12/27/2022      Subjective     Chief Complaint   Patient presents with   • Fever   • Generalized Body Aches   • Dizziness       Summary: 32 y.o. with ESRD on Tuesday Thursday Saturday hemodialysis, history of SVC syndrome with prior stenting, hydrocephalus with prior  shunt, presented with 4 days of worsening fevers and chills, found to have MSSA bacteremia, large pericardial effusion, COVID-19 infection on 12/20.  Having persistent fevers, concern for possible infected AV fistula, vascular surgery assisting with evaluation.    Interval Followup: Having fevers again today.  Still has some pain in his right upper extremity fistula site.  Ultrasound evaluation still pending.  Patient having snoring when he first noted the room, was on CPAP in the past but this was [place]  Discussed further evaluation here in trying tonight.  Struggled in the past with this and likely will just need nocturnal oxygen but states is willing to try wearing the mask at night and see if he can tolerate this   denies any GI bleeding    Review of Systems  No chest pain or palpitations  As above    Objective     Vitals:   Temp:  [97.8 °F (36.6 °C)-102.6 °F (39.2 °C)] 102.6 °F (39.2 °C)  Heart Rate:  [] 113  Resp:  [16-18] 16  BP: (122-170)/(51-88) 155/87  Flow (L/min):  [2] 2    Physical Exam   Gen: Sleeping, snoring in bed, occasional apneic episodes, wakes fairly easily does take a second to get his bearings, conversant and appropriate after that  HENT: NCAT, thick neck, right-sided edema   Resp: Bilateral rhonchi, no acute crackles or wheezing noted slightly diminished in lower lung fields, on nasal cannula mild conversational dyspnea  CV: RRR, right extremity fistula any acute drainage, somewhat tender to palpation, positive thrill present   GI: Abdomen soft, NT, ND, no  guarding, +BS  Psych: appropriate mood and affect, aox3  Skin: warm, dry,     Result Review:  Vital signs, labs and recent relevant imaging reviewed.      !Patient Home Medications Stored in Pharmacy, , Does not apply, BID  calcitriol, 0.25 mcg, Oral, Every Other Day  carvedilol, 12.5 mg, Oral, BID  ceFAZolin, 1 g, Intravenous, Q24H  heparin (porcine), 5,000 Units, Subcutaneous, Q8H  lidocaine-prilocaine, 1 application, Topical, Once per day on Mon Wed Fri  losartan, 100 mg, Oral, Nightly  NIFEdipine XL, 60 mg, Oral, Daily  polyethylene glycol, 17 g, Oral, BID  sodium chloride, 10 mL, Intravenous, Q12H        •  acetaminophen  •  aluminum-magnesium hydroxide-simethicone  •  HYDROcodone-acetaminophen  •  melatonin  •  nitroglycerin  •  polyethylene glycol  •  sodium chloride  •  sodium chloride  •  sodium chloride      Assessment / Plan     Assessment/Plan:  MSSA bacteremia  Sepsis secondary to MSSA bacteremia, suspect secondary to left lower lobe pneumonia   COVID-19 pneumonia  Diastolic CHF with borderline reduced EF 46 to 50%, grade 2 diastolic dysfunction  Large, greater than 2 cm circumferential pericardial effusion without tamponade  Hydrocephalus with  shunt  History of SVC syndrome with prior stenting approximately 2016  ESRD on hemodialysis  Chronic normocytic anemia  Hypertension  Iron deficiency anemia  Severe protein calorie malnutrition  Lactic acidosis, clinically significant  BRIAN on previously on NIPPV, not recently given unit recalled   Difficult social status: Homeless and recently from a 10  GERD  Severe hypophosphatemia    -IV cefazolin for MSSA bacteremia/pneumonia, repeat blood cultures ordered given fever, blood culture obtained from fistula during dialysis pending from today as well, if cultures positive may need to evaluate further with ABRIL     *White count improving persistent fevers may be from resolving COVID and bacterial infection, follow-up Pro-Marcos in morning, defer additional  antibiotics for now  - dr moore/Vascular surgery assisting evaluation, right upper extremity AV fistula ultrasound pending for further evaluation, CT chest evaluated showing patent SVC some chronic atrophic obstruction in the right general jugular vein, notable body wall edema and lymphadenopathy most notable in right axilla suspect this is likely congestive in setting of poor return.  -RT Case management consult, try CPAP at night, otherwise likely will need nocturnal oxygen  - Hemoglobin variable, decreased most recently, check anemia studies, patient has component of iron deficiency and likely anemia of chronic disease, monitor for bleeding needs outpatient scoping  -Continue hemodialysis, additional session today, discussed with nephrology appreciate assistance  - Change tramadol to Norco given pain complaints per patient, discussed that this would be in the hospital only and will not send any at discharge.  - Continue Coreg nifedipine and losartan, monitor blood pressure  -Monitor replace electrolytes  - Continue to monitor labs    Continue inpatient monitoring and treatment.    DVT prophylaxis: Start on heparin DVT prophylaxis     Code Status (Patient has no pulse and is not breathing): CPR (Attempt to Resuscitate)  Medical Interventions (Patient has pulse or is breathing): Full Support        CBC    CBC 12/25/22 12/26/22 12/27/22   WBC 11.87 (A) 11.17 (A) 10.83 (A)   RBC 3.06 (A) 3.16 (A) 2.67 (A)   Hemoglobin 8.6 (A) 9.0 (A) 7.6 (A)   Hematocrit 27.1 (A) 28.7 (A) 24.1 (A)   MCV 88.6 90.8 90.3   MCH 28.1 28.5 28.5   MCHC 31.7 31.4 (A) 31.5   RDW 19.5 (A) 20.6 (A) 20.4 (A)   Platelets 148 213 255   (A) Abnormal value              CMP    CMP 12/25/22 12/26/22 12/27/22   Glucose 138 (A) 109 (A) 146 (A)   BUN 42 (A) 73 (A) 85 (A)   Creatinine 6.60 (A) 8.71 (A) 10.00 (A)   eGFR 10.6 (A) 7.6 (A) 6.4 (A)   Sodium 131 (A) 133 (A) 126 (A)   Potassium 3.6 4.6 4.6   Chloride 91 (A) 90 (A) 85 (A)   Calcium 7.8 (A) 8.1 (A)  6.8 (A)   Total Protein 7.6 8.1 7.7   Albumin 3.40 (A) 3.60 3.3 (A)   Globulin 4.2 4.5 4.4   Total Bilirubin 0.3 0.3 0.3   Alkaline Phosphatase 93 134 (A) 96   AST (SGOT) 14 17 16   ALT (SGPT) <5 <5 <5   Albumin/Globulin Ratio 0.8 0.8 0.8   BUN/Creatinine Ratio 6.4 (A) 8.4 8.5   Anion Gap 13.5 16.5 (A) 16.4 (A)   (A) Abnormal value       Comments are available for some flowsheets but are not being displayed.             Dispo: pending stability in clinical condition and appropriate discharge location.

## 2022-12-27 NOTE — CONSULTS
"Nutrition Services    Patient Name: Ghassan Browning  YOB: 1990  MRN: [id number]  Admission date: 12/21/2022      CLINICAL NUTRITION ASSESSMENT      Reason for Assessment  BMI     H&P:    Past Medical History:   Diagnosis Date   • Bronchitis    • Chronic pain    • Contusion, hip    • ESRD on dialysis ([facility])    • Headache    • History of brain shunt    • Hypertension    • Kidney disease    • Sleep apnea    • Wrist sprain         Current Problems:   Active Hospital Problems    Diagnosis    • **Sepsis, due to unspecified organism, unspecified whether acute organ dysfunction present ([facility])    • Pericardial effusion    • ESRD (end stage renal disease) ([facility])         Nutrition/Diet History         Narrative     RD follow up for full assessment. Pt had lunch in room, ~75% consumed. Pt reports being a \"big eater\" with a \"good appetite\" Natural dentition, abscess in the back, no swallow difficulty. Pt reported preference for Novasource Renal over Boost, was switched to Boost for higher phosphorus (now WNL). RD obtained preferences, will update diet message. Pt reports UBW of 115, current weight reflects 6% loss from usual, pt had HD on 12/22, weight loss likely from fluid.        Anthropometrics        Current Height, Weight Height: 162.6 cm (64\")  Weight: 49 kg (108 lb 0.4 oz)   Current BMI Body mass index is 18.54 kg/m².       Weight Hx  Wt Readings from Last 30 Encounters:   12/24/22 0950 49 kg (108 lb 0.4 oz)   12/21/22 0511 52.2 kg (115 lb 1.3 oz)   12/21/22 0141 52.6 kg (115 lb 15.4 oz)            Wt Change Observation 6% loss from reported UBW     Estimated/Assessed Needs       Energy Requirements 35 kcal/kg   EST Needs (kcal/day) 1715       Protein Requirements 1-1.2 g/kg   EST Daily Needs (g/day) 49-59       Fluid Requirements     Estimated Needs (mL/day) 2000 mL/d fluid restriction     Labs/Medications         Pertinent Labs Reviewed.   Results from last 7 days   Lab Units 12/27/22  0557 12/26/22  0803 " 12/25/22  0546   SODIUM mmol/L 126* 133* 131*   POTASSIUM mmol/L 4.6 4.6 3.6   CHLORIDE mmol/L 85* 90* 91*   CO2 mmol/L 24.6 26.5 26.5   BUN mg/dL 85* 73* 42*   CREATININE mg/dL 10.00* 8.71* 6.60*   CALCIUM mg/dL 6.8* 8.1* 7.8*   BILIRUBIN mg/dL 0.3 0.3 0.3   ALK PHOS U/L 96 134* 93   ALT (SGPT) U/L <5 <5 <5   AST (SGOT) U/L 16 17 14   GLUCOSE mg/dL 146* 109* 138*     Results from last 7 days   Lab Units 12/27/22  0557 12/26/22  0803 12/25/22  0546   MAGNESIUM mg/dL 2.6 2.7* 2.2   PHOSPHORUS mg/dL 3.1 0.7* 0.8*   HEMOGLOBIN g/dL 7.6* 9.0* 8.6*   HEMATOCRIT % 24.1* 28.7* 27.1*     COVID19   Date Value Ref Range Status   12/20/2022 Detected (C) Not Detected - Ref. Range Final     Lab Results   Component Value Date    HGBA1C 5.0 11/26/2021         Pertinent Medications Reviewed.     Current Nutrition Orders & Evaluation of Intake       Oral Nutrition     Current PO Diet Diet: Renal Diets, Fluid Restriction (240 mL/tray) Diets; Low Potassium; 2000 mL/day; Texture: Regular Texture (IDDSI 7); Fluid Consistency: Thin (IDDSI 0)   Supplement Orders Placed This Encounter      Dietary Nutrition Supplements Novasource Renal (Nepro)       Malnutrition Severity Assessment                Nutrition Diagnosis         Nutrition Dx Problem 1 Increased nutrient needs related to endocrine dysfunction and catabolic illness as evidenced by ESRD on HD, Covid (+), hx PCM, and BMI 18.5.       Nutrition Intervention         [facility] po TID w/ meals     Medical Nutrition Therapy/Nutrition Education          Learner     Readiness N/A  Education not indicated at this time     Method     Response N/A  N/A     Monitor/Evaluation        Monitor PO intake, Supplement intake, Pertinent labs, Weight       Nutrition Discharge Plan         To be determined       Electronically signed by:  Padmini Canela RD  12/27/22 14:58 EST

## 2022-12-27 NOTE — PROGRESS NOTES
[facility]     Nephrology Progress Note      Patient Name: Ghassan Browning  : 1990  MRN: [id number]  Primary Care Physician:  Paulina Cullen MD  Date of admission: 2022    Subjective   Subjective     Interval History:  Patient Reports feeling better but still weak and tired.   Having some pain in access arm. No nausea or vomiting.  Tolerating p.o.   Some intermittent cough, COVID-positive and positive staph bacteremia.    Review of Systems   All systems were reviewed and negative except for: What is mentioned above.    Objective   Objective     Vitals:   Temp:  [97.6 °F (36.4 °C)-98.5 °F (36.9 °C)] 98.5 °F (36.9 °C)  Heart Rate:  [] 95  Resp:  [18-20] 18  BP: (122-138)/(51-72) 134/63  Flow (L/min):  [2] 2  Physical Exam:      Constitutional: Awake, alert, no distress, conversant and pleasant, sitting in bed.  Intermittent cough.              Eyes: sclerae anicteric, no conjunctival injection              HENT: mucous membranes moist              Neck: Supple, no thyromegaly, no lymphadenopathy, trachea midline, No JVD              Respiratory: Decreased to auscultation bilaterally, nonlabored respirations               Cardiovascular: RRR, no murmurs, rubs, or gallops.              Gastrointestinal: Positive bowel sounds, soft, nontender, nondistended              Musculoskeletal: No edema, no clubbing or cyanosis, right upper extremity AV graft, patent.              Psychiatric: Appropriate affect, cooperative              Neurologic: Oriented x 3, moving all extremities, Cranial Nerves grossly intact, speech clear              Skin: warm and dry, no rashes     Result Review    Result Reviewed:  I have personally reviewed the results from the time of this admission to 2022 08:31 EST and agree with these findings:  [x]  Laboratory  []  Microbiology  [x]  Radiology  []  EKG/Telemetry   []  Cardiology/Vascular   []  Pathology  []  Old records  []  Other:  Lab Results   Component Value  Date    GLUCOSE 146 (H) 12/27/2022    CALCIUM 6.8 (L) 12/27/2022     (L) 12/27/2022    K 4.6 12/27/2022    CO2 24.6 12/27/2022    CL 85 (L) 12/27/2022    BUN 85 (H) 12/27/2022    CREATININE 10.00 (H) 12/27/2022    BCR 8.5 12/27/2022    ANIONGAP 16.4 (H) 12/27/2022     Lab Results   Component Value Date    CALCIUM 6.8 (L) 12/27/2022    PHOS 3.1 12/27/2022      Results from last 7 days   Lab Units 12/27/22  0557   MAGNESIUM mg/dL 2.6              Assessment & Plan   Assessment / Plan       Active Hospital Problems:  Active Hospital Problems    Diagnosis    • **Sepsis, due to unspecified organism, unspecified whether acute organ dysfunction present ([facility])    • Pericardial effusion    • ESRD (end stage renal disease) ([facility])        Assessment and Plan:    - End-stage renal disease, dialysis TTS schedule through right upper extremity AV graft.  Continue same schedule.  UF 1-2 kg as tolerated.  - History of SVC syndrome.  - COVID pneumonia, and MSSA bacteremia, on antibiotics per primary.  Had previously had vascular stents.  AV graft seems to be working well.  He notes some drainage from AVG, concern that may be graft infection.  Will check blood cx on dialysis today.  May need to get vascular consulted if positive with recurrent fevers.  - Hypertension, with hypotension.  Monitor for now.  - Hyponatremia with hypervolemia, continue 2000 cc p.o. fluid restriction per day and try to correct with dialysis.  - Metabolic acidosis, mild.  Improved after dialysis.  - History of  shunt.  - Large pericardial effusion, evaluated by cardiology.  No tamponade.  45 to 50% EF.  Severe LVH with diastolic dysfunction.  - Hyperphosphatemia, then hypophosphatemia.  Replaced IV yesterday, had dialysis, still low this morning.  Getting additional replacement of phosphorus.  Not on low phosphorus diet.  Continue calcitriol 0.25 mcg on dialysis days per home dosing.

## 2022-12-27 NOTE — PLAN OF CARE
Goal Outcome Evaluation:   Pt Temperature 102.6 in afternoon. Pt vital signs otherwise stable. Pt treated for pain and indigestion during shift. Pt at dialysis much of day. Pt treated for pain twice during shift and indigestion once during shift. Pt has scant thick white and red discharge from AV fistula and reports tenderness at site.

## 2022-12-27 NOTE — PLAN OF CARE
Goal Outcome Evaluation:  Plan of Care Reviewed With: patient        Progress: no change    PATIENT WITH TENDERNESS TO RIGHT UPPER ARM AT AV FISTULA SITE BEGAN TO HAVE SEROSANG DRAINAGE MD NOTIFIED AND ORDERS TO APPLY DSG TO SITE, PRN PAIN MEDICATIONS AND MAALOX GIVEN

## 2022-12-28 ENCOUNTER — APPOINTMENT (OUTPATIENT)
Dept: GENERAL RADIOLOGY | Facility: HOSPITAL | Age: 32
DRG: 871 | End: 2022-12-28
Payer: MEDICARE

## 2022-12-28 LAB
ALBUMIN SERPL-MCNC: 3.7 G/DL (ref 3.5–5.2)
ALBUMIN/GLOB SERPL: 0.8 G/DL
ALP SERPL-CCNC: 99 U/L (ref 39–117)
ALT SERPL W P-5'-P-CCNC: <5 U/L (ref 1–41)
ANION GAP SERPL CALCULATED.3IONS-SCNC: 12.6 MMOL/L (ref 5–15)
AST SERPL-CCNC: 18 U/L (ref 1–40)
BACTERIA SPEC AEROBE CULT: NORMAL
BACTERIA SPEC AEROBE CULT: NORMAL
BASOPHILS # BLD AUTO: 0.05 10*3/MM3 (ref 0–0.2)
BASOPHILS NFR BLD AUTO: 0.5 % (ref 0–1.5)
BILIRUB SERPL-MCNC: 0.4 MG/DL (ref 0–1.2)
BUN SERPL-MCNC: 61 MG/DL (ref 6–20)
BUN/CREAT SERPL: 8 (ref 7–25)
CALCIUM SPEC-SCNC: 7.8 MG/DL (ref 8.6–10.5)
CHLORIDE SERPL-SCNC: 89 MMOL/L (ref 98–107)
CO2 SERPL-SCNC: 29.4 MMOL/L (ref 22–29)
CREAT SERPL-MCNC: 7.67 MG/DL (ref 0.76–1.27)
D-LACTATE SERPL-SCNC: 1.1 MMOL/L (ref 0.5–2)
DEPRECATED RDW RBC AUTO: 71.6 FL (ref 37–54)
EGFRCR SERPLBLD CKD-EPI 2021: 8.8 ML/MIN/1.73
EOSINOPHIL # BLD AUTO: 0.35 10*3/MM3 (ref 0–0.4)
EOSINOPHIL NFR BLD AUTO: 3.4 % (ref 0.3–6.2)
ERYTHROCYTE [DISTWIDTH] IN BLOOD BY AUTOMATED COUNT: 20.7 % (ref 12.3–15.4)
FOLATE SERPL-MCNC: 10.1 NG/ML (ref 4.78–24.2)
GLOBULIN UR ELPH-MCNC: 4.4 GM/DL
GLUCOSE SERPL-MCNC: 85 MG/DL (ref 65–99)
HCT VFR BLD AUTO: 24.4 % (ref 37.5–51)
HGB BLD-MCNC: 7.4 G/DL (ref 13–17.7)
IMM GRANULOCYTES # BLD AUTO: 0.11 10*3/MM3 (ref 0–0.05)
IMM GRANULOCYTES NFR BLD AUTO: 1.1 % (ref 0–0.5)
LYMPHOCYTES # BLD AUTO: 1.3 10*3/MM3 (ref 0.7–3.1)
LYMPHOCYTES NFR BLD AUTO: 12.7 % (ref 19.6–45.3)
MAGNESIUM SERPL-MCNC: 2.6 MG/DL (ref 1.6–2.6)
MCH RBC QN AUTO: 28.2 PG (ref 26.6–33)
MCHC RBC AUTO-ENTMCNC: 30.3 G/DL (ref 31.5–35.7)
MCV RBC AUTO: 93.1 FL (ref 79–97)
MONOCYTES # BLD AUTO: 1.83 10*3/MM3 (ref 0.1–0.9)
MONOCYTES NFR BLD AUTO: 17.9 % (ref 5–12)
NEUTROPHILS NFR BLD AUTO: 6.61 10*3/MM3 (ref 1.7–7)
NEUTROPHILS NFR BLD AUTO: 64.4 % (ref 42.7–76)
NRBC BLD AUTO-RTO: 0 /100 WBC (ref 0–0.2)
PHOSPHATE SERPL-MCNC: 2.2 MG/DL (ref 2.5–4.5)
PLATELET # BLD AUTO: 270 10*3/MM3 (ref 140–450)
PMV BLD AUTO: 10.6 FL (ref 6–12)
POTASSIUM SERPL-SCNC: 4.7 MMOL/L (ref 3.5–5.2)
PROCALCITONIN SERPL-MCNC: 54.22 NG/ML (ref 0–0.25)
PROT SERPL-MCNC: 8.1 G/DL (ref 6–8.5)
RBC # BLD AUTO: 2.62 10*6/MM3 (ref 4.14–5.8)
SODIUM SERPL-SCNC: 131 MMOL/L (ref 136–145)
VIT B12 BLD-MCNC: 604 PG/ML (ref 211–946)
WBC NRBC COR # BLD: 10.25 10*3/MM3 (ref 3.4–10.8)

## 2022-12-28 PROCEDURE — 83605 ASSAY OF LACTIC ACID: CPT | Performed by: INTERNAL MEDICINE

## 2022-12-28 PROCEDURE — 85025 COMPLETE CBC W/AUTO DIFF WBC: CPT | Performed by: FAMILY MEDICINE

## 2022-12-28 PROCEDURE — 99232 SBSQ HOSP IP/OBS MODERATE 35: CPT | Performed by: INTERNAL MEDICINE

## 2022-12-28 PROCEDURE — 80053 COMPREHEN METABOLIC PANEL: CPT | Performed by: FAMILY MEDICINE

## 2022-12-28 PROCEDURE — 82746 ASSAY OF FOLIC ACID SERUM: CPT | Performed by: INTERNAL MEDICINE

## 2022-12-28 PROCEDURE — 83735 ASSAY OF MAGNESIUM: CPT | Performed by: INTERNAL MEDICINE

## 2022-12-28 PROCEDURE — 84100 ASSAY OF PHOSPHORUS: CPT | Performed by: FAMILY MEDICINE

## 2022-12-28 PROCEDURE — 25010000002 CEFAZOLIN 1-4 GM/50ML-% SOLUTION: Performed by: INTERNAL MEDICINE

## 2022-12-28 PROCEDURE — 82607 VITAMIN B-12: CPT | Performed by: INTERNAL MEDICINE

## 2022-12-28 PROCEDURE — 84145 PROCALCITONIN (PCT): CPT | Performed by: INTERNAL MEDICINE

## 2022-12-28 PROCEDURE — 74018 RADEX ABDOMEN 1 VIEW: CPT

## 2022-12-28 RX ORDER — TRAMADOL HYDROCHLORIDE 50 MG/1
50 TABLET ORAL EVERY 8 HOURS PRN
Status: DISCONTINUED | OUTPATIENT
Start: 2022-12-28 | End: 2023-01-01 | Stop reason: HOSPADM

## 2022-12-28 RX ADMIN — HYDROCODONE BITARTRATE AND ACETAMINOPHEN 1 TABLET: 5; 325 TABLET ORAL at 06:18

## 2022-12-28 RX ADMIN — FERROUS SULFATE TAB 325 MG (65 MG ELEMENTAL FE) 325 MG: 325 (65 FE) TAB at 09:57

## 2022-12-28 RX ADMIN — POLYETHYLENE GLYCOL 3350 17 G: 17 POWDER, FOR SOLUTION ORAL at 21:48

## 2022-12-28 RX ADMIN — ACETAMINOPHEN 650 MG: 325 TABLET ORAL at 00:36

## 2022-12-28 RX ADMIN — HYDROCODONE BITARTRATE AND ACETAMINOPHEN 1 TABLET: 5; 325 TABLET ORAL at 18:42

## 2022-12-28 RX ADMIN — TRAMADOL HYDROCHLORIDE 50 MG: 50 TABLET, COATED ORAL at 15:45

## 2022-12-28 RX ADMIN — POLYETHYLENE GLYCOL 3350 17 G: 17 POWDER, FOR SOLUTION ORAL at 09:56

## 2022-12-28 RX ADMIN — CARVEDILOL 12.5 MG: 12.5 TABLET, FILM COATED ORAL at 21:46

## 2022-12-28 RX ADMIN — Medication 10 ML: at 21:48

## 2022-12-28 RX ADMIN — Medication 10 ML: at 09:56

## 2022-12-28 RX ADMIN — ACETAMINOPHEN 650 MG: 325 TABLET ORAL at 12:13

## 2022-12-28 RX ADMIN — CARVEDILOL 12.5 MG: 12.5 TABLET, FILM COATED ORAL at 09:57

## 2022-12-28 RX ADMIN — CEFAZOLIN SODIUM 1 G: 1 INJECTION, SOLUTION INTRAVENOUS at 21:42

## 2022-12-28 RX ADMIN — LOSARTAN POTASSIUM 100 MG: 50 TABLET, FILM COATED ORAL at 21:47

## 2022-12-28 RX ADMIN — NIFEDIPINE 60 MG: 60 TABLET, EXTENDED RELEASE ORAL at 09:57

## 2022-12-28 NOTE — PLAN OF CARE
Goal Outcome Evaluation:  Plan of Care Reviewed With: patient        Progress: no change    Patient given tylenol x1 and pain medication, rested well snoring during sleep with periods of apnea. New IV placed

## 2022-12-28 NOTE — SIGNIFICANT NOTE
12/28/22 1257   Plan   Plan SW followed up with pt regarding discharge planning. Pt reports he will have to go back to his friends house when he leaves the hospital. Pt requested for a hotel voucher until he can get moved to [facility]. SW informed pt that resource is not available at the hospital. SW offered homeless shelter resources and pt reports he cannot go to a homeless shelter. SW can arrange [employer] transportation at discharge.

## 2022-12-28 NOTE — PROGRESS NOTES
[facility]   Hospitalist Progress Note    Date of admission: 12/21/2022  Patient Name: Ghassan Brownign  1990  Date: 12/28/2022      Subjective     Chief Complaint   Patient presents with   • Fever   • Generalized Body Aches   • Dizziness       Summary: 32 y.o. with ESRD on Tuesday Thursday Saturday hemodialysis, history of SVC syndrome with prior stenting, hydrocephalus with prior  shunt, presented with 4 days of worsening fevers and chills, found to have MSSA bacteremia, large pericardial effusion, COVID-19 infection on 12/20.  Having persistent fevers, concern for possible infected AV fistula, vascular surgery assisting with evaluation.    Interval Followup: No acute events overnight, resting comfortably in bed, still with loud snoring, periods of apnea while sleeping, would benefit from CPAP however per reports patient does not tolerate    Review of Systems  No reported chest pain or palpitations  No reported nausea or vomiting  No further reported fevers or chills    Objective     Vitals:   Temp:  [98.2 °F (36.8 °C)-102.6 °F (39.2 °C)] 99.1 °F (37.3 °C)  Heart Rate:  [] 98  Resp:  [16-18] 18  BP: (153-170)/(82-93) 160/90    Physical Exam   General appearance: NAD, sonorous  Eyes: anicteric sclerae, moist conjunctivae; no lid-lag; PERRLA  HENT: Atraumatic; oropharynx clear with moist mucous membranes and no mucosal ulcerations; normal hard and soft palate, facial swelling noted  Neck: Trachea midline; FROM, supple, no thyromegaly or lymphadenopathy  Lungs: CTA, with normal respiratory effort and no intercostal retractions  CV: Regular Rate and Rhythm, no Murmurs, Rubs, or Gallops   Abdomen: Soft, non-tender; no masses or Heptosplenomegally  Extremities: No peripheral edema or extremity lymphadenopathy  Skin: Normal temperature, turgor and texture; no rash, ulcers or subcutaneous nodules  Psych: Appropriate affect, alert and oriented to person, place and time  Neuro: CN II - XII intact no motor  deficits, no sensory defecits, globally weak      Result Review:  Vital signs, labs and recent relevant imaging reviewed.      !Patient Home Medications Stored in Pharmacy, , Does not apply, BID  calcitriol, 0.25 mcg, Oral, Every Other Day  carvedilol, 12.5 mg, Oral, BID  ceFAZolin, 1 g, Intravenous, Q24H  ferrous sulfate, 325 mg, Oral, Daily With Breakfast  heparin (porcine), 5,000 Units, Subcutaneous, Q8H  lidocaine-prilocaine, 1 application, Topical, Once per day on Mon Wed Fri  losartan, 100 mg, Oral, Nightly  NIFEdipine XL, 60 mg, Oral, Daily  polyethylene glycol, 17 g, Oral, BID  sodium chloride, 10 mL, Intravenous, Q12H        •  acetaminophen  •  aluminum-magnesium hydroxide-simethicone  •  HYDROcodone-acetaminophen  •  melatonin  •  nitroglycerin  •  polyethylene glycol  •  sodium chloride  •  sodium chloride  •  sodium chloride      Assessment / Plan     Assessment/Plan:  MSSA bacteremia  Sepsis secondary to MSSA bacteremia, suspect secondary to left lower lobe pneumonia   COVID-19 pneumonia  Diastolic CHF with borderline reduced EF 46 to 50%, grade 2 diastolic dysfunction  Large, greater than 2 cm circumferential pericardial effusion without tamponade  Hydrocephalus with  shunt  History of SVC syndrome with prior stenting approximately 2016  ESRD on hemodialysis  Chronic normocytic anemia  Hypertension  Iron deficiency anemia  Severe protein calorie malnutrition  Lactic acidosis, clinically significant  BRIAN on previously on NIPPV, not recently given unit recalled   Difficult social status: Homeless and recently from a 10  GERD  Severe hypophosphatemia    -Continue IV cefazolin for MSSA bacteremia/pneumonia, repeat blood cultures ordered given fever, blood culture obtained from fistula during dialysis pending from today as well, if cultures positive may need to evaluate further with ABRIL, these are pending    *White count continues to improve persistent fevers may be from resolving COVID and bacterial  infection, follow-up [facility] mildly improved  - dr moore/Vascular surgery assisting evaluation, right upper extremity AV fistula ultrasound pending for further evaluation, CT chest evaluated showing patent SVC some chronic atrophic obstruction in the right general jugular vein, notable body wall edema and lymphadenopathy most notable in right axilla suspect this is likely congestive in setting of poor return.  -RT Case management consult, try CPAP at night, otherwise likely will need nocturnal oxygen  - Hemoglobin variable, decreased most recently, check anemia studies, patient has component of iron deficiency and likely anemia of chronic disease, monitor for bleeding needs outpatient scoping  -Continue hemodialysis,, nephrology following  - Continue oral Norco  - Continue Coreg nifedipine and losartan, monitor blood pressure, blood pressure improved with mildly elevated  -Monitor replace electrolytes as needed  - Continue to monitor labs    Continue inpatient monitoring and treatment.    DVT prophylaxis: Start on heparin DVT prophylaxis     Code Status (Patient has no pulse and is not breathing): CPR (Attempt to Resuscitate)  Medical Interventions (Patient has pulse or is breathing): Full Support        CBC    CBC 12/26/22 12/27/22 12/28/22   WBC 11.17 (A) 10.83 (A) 10.25   RBC 3.16 (A) 2.67 (A) 2.62 (A)   Hemoglobin 9.0 (A) 7.6 (A) 7.4 (A)   Hematocrit 28.7 (A) 24.1 (A) 24.4 (A)   MCV 90.8 90.3 93.1   MCH 28.5 28.5 28.2   MCHC 31.4 (A) 31.5 30.3 (A)   RDW 20.6 (A) 20.4 (A) 20.7 (A)   Platelets 213 255 270   (A) Abnormal value              CMP    CMP 12/26/22 12/27/22 12/28/22   Glucose 109 (A) 146 (A) 85   BUN 73 (A) 85 (A) 61 (A)   Creatinine 8.71 (A) 10.00 (A) 7.67 (A)   eGFR 7.6 (A) 6.4 (A) 8.8 (A)   Sodium 133 (A) 126 (A) 131 (A)   Potassium 4.6 4.6 4.7   Chloride 90 (A) 85 (A) 89 (A)   Calcium 8.1 (A) 6.8 (A) 7.8 (A)   Total Protein 8.1 7.7 8.1   Albumin 3.60 3.3 (A) 3.7   Globulin 4.5 4.4 4.4   Total  Bilirubin 0.3 0.3 0.4   Alkaline Phosphatase 134 (A) 96 99   AST (SGOT) 17 16 18   ALT (SGPT) <5 <5 <5   Albumin/Globulin Ratio 0.8 0.8 0.8   BUN/Creatinine Ratio 8.4 8.5 8.0   Anion Gap 16.5 (A) 16.4 (A) 12.6   (A) Abnormal value       Comments are available for some flowsheets but are not being displayed.           Telemetry:    Reviewed patients labs and imaging, and discussed with patient and nurse at bedside.      Electronically signed by Ramón Murray MD, 12/28/22, 11:57 AM EST.

## 2022-12-28 NOTE — CONSULTS
RT CM consulted to assist patient with recalled CPAP.  Mr Browning states he completed a PSG while he lived in [place].  At the time of the recall, he was noncompliant with use so he abandoned the device when he moved to [place] and never registered it for replacement.  Unfortunately, I am unable to assist with completing replacement request without the serial number of his current CPAP.  Mr Browning currently has a different insurance that was not used for previous billing of CPAP and PSG.  I recommended he complete another sleep study to receive a new CPAP.  Mr Browning declined, stating he is homeless and will be moving back to [place] as soon as he is discharged.

## 2022-12-28 NOTE — PROGRESS NOTES
[facility]     Nephrology Progress Note      Patient Name: Ghassan Browning  : 1990  MRN: [id number]  Primary Care Physician:  Paulina Cullen MD  Date of admission: 2022    Subjective   Subjective     Interval History:  Patient Reports feeling better but still weak and tired.   Having some pain in access arm. No nausea or vomiting.  Tolerating p.o.   Some intermittent cough, COVID-positive and positive staph bacteremia.  Reports some stiffness/pain in abd with previous heparin injections    Review of Systems   All systems were reviewed and negative except for: What is mentioned above.    Objective   Objective     Vitals:   Temp:  [98.1 °F (36.7 °C)-99.9 °F (37.7 °C)] 98.1 °F (36.7 °C)  Heart Rate:  [90-99] 91  Resp:  [16-18] 18  BP: (142-170)/(78-93) 142/78  Physical Exam:      Constitutional: Awake, alert, no distress, conversant and pleasant, sitting in bed.  Intermittent cough.              Eyes: sclerae anicteric, no conjunctival injection              HENT: mucous membranes moist              Neck: Supple, no thyromegaly, no lymphadenopathy, trachea midline, No JVD              Respiratory: Decreased to auscultation bilaterally, nonlabored respirations               Cardiovascular: RRR, no murmurs, rubs, or gallops.              Gastrointestinal: Positive bowel sounds, soft, nontender, nondistended              Musculoskeletal: No edema, no clubbing or cyanosis, right upper extremity AV graft, patent.              Psychiatric: Appropriate affect, cooperative              Neurologic: Oriented x 3, moving all extremities, Cranial Nerves grossly intact, speech clear              Skin: warm and dry, no rashes.  No induration/rash on abd in area of pain.    Result Review    Result Reviewed:  I have personally reviewed the results from the time of this admission to 2022 18:48 EST and agree with these findings:  [x]  Laboratory  []  Microbiology  [x]  Radiology  []  EKG/Telemetry   []   Cardiology/Vascular   []  Pathology  []  Old records  []  Other:  Lab Results   Component Value Date    GLUCOSE 85 12/28/2022    CALCIUM 7.8 (L) 12/28/2022     (L) 12/28/2022    K 4.7 12/28/2022    CO2 29.4 (H) 12/28/2022    CL 89 (L) 12/28/2022    BUN 61 (H) 12/28/2022    CREATININE 7.67 (H) 12/28/2022    BCR 8.0 12/28/2022    ANIONGAP 12.6 12/28/2022     Lab Results   Component Value Date    CALCIUM 7.8 (L) 12/28/2022    PHOS 2.2 (L) 12/28/2022      Results from last 7 days   Lab Units 12/28/22  0542   MAGNESIUM mg/dL 2.6              Assessment & Plan   Assessment / Plan       Active Hospital Problems:  Active Hospital Problems    Diagnosis    • **Sepsis, due to unspecified organism, unspecified whether acute organ dysfunction present ([facility])    • Pericardial effusion    • ESRD (end stage renal disease) ([facility])        Assessment and Plan:    - End-stage renal disease, dialysis TTS schedule through right upper extremity AV graft.  Continue same schedule.  UF 1-2 kg as tolerated.  - History of SVC syndrome.  - COVID pneumonia, and MSSA bacteremia, on antibiotics per primary.  Had previously had vascular stents.  AV graft seems to be working well.  He notes some drainage from AVG, concern that may be graft infection.  Repeat blood cx from dialysis no growth.  - Hypertension, with hypotension.  Monitor for now.  - Hyponatremia with hypervolemia, continue 2000 cc p.o. fluid restriction per day and try to correct with dialysis.  - Metabolic acidosis, mild.  Improved after dialysis.  - History of  shunt.  - Large pericardial effusion, evaluated by cardiology.  No tamponade.  45 to 50% EF.  Severe LVH with diastolic dysfunction.  - Hyperphosphatemia, then hypophosphatemia.  Replaced IV yesterday, had dialysis, still low this morning.  Getting additional replacement of phosphorus.  Not on low phosphorus diet.  Continue calcitriol 0.25 mcg on dialysis days per home dosing.

## 2022-12-29 LAB
ALBUMIN SERPL-MCNC: 3.4 G/DL (ref 3.5–5.2)
ALBUMIN/GLOB SERPL: 0.7 G/DL
ALP SERPL-CCNC: 88 U/L (ref 39–117)
ALT SERPL W P-5'-P-CCNC: <5 U/L (ref 1–41)
ANION GAP SERPL CALCULATED.3IONS-SCNC: 14.3 MMOL/L (ref 5–15)
AST SERPL-CCNC: 15 U/L (ref 1–40)
BASOPHILS # BLD AUTO: 0.05 10*3/MM3 (ref 0–0.2)
BASOPHILS NFR BLD AUTO: 0.6 % (ref 0–1.5)
BILIRUB SERPL-MCNC: 0.3 MG/DL (ref 0–1.2)
BUN SERPL-MCNC: 82 MG/DL (ref 6–20)
BUN/CREAT SERPL: 8.4 (ref 7–25)
CALCIUM SPEC-SCNC: 7.7 MG/DL (ref 8.6–10.5)
CHLORIDE SERPL-SCNC: 88 MMOL/L (ref 98–107)
CO2 SERPL-SCNC: 28.7 MMOL/L (ref 22–29)
CREAT SERPL-MCNC: 9.75 MG/DL (ref 0.76–1.27)
DEPRECATED RDW RBC AUTO: 68.5 FL (ref 37–54)
EGFRCR SERPLBLD CKD-EPI 2021: 6.6 ML/MIN/1.73
EOSINOPHIL # BLD AUTO: 0.4 10*3/MM3 (ref 0–0.4)
EOSINOPHIL NFR BLD AUTO: 4.4 % (ref 0.3–6.2)
ERYTHROCYTE [DISTWIDTH] IN BLOOD BY AUTOMATED COUNT: 20.4 % (ref 12.3–15.4)
GLOBULIN UR ELPH-MCNC: 4.7 GM/DL
GLUCOSE SERPL-MCNC: 110 MG/DL (ref 65–99)
HCT VFR BLD AUTO: 23.1 % (ref 37.5–51)
HGB BLD-MCNC: 7 G/DL (ref 13–17.7)
IMM GRANULOCYTES # BLD AUTO: 0.08 10*3/MM3 (ref 0–0.05)
IMM GRANULOCYTES NFR BLD AUTO: 0.9 % (ref 0–0.5)
LYMPHOCYTES # BLD AUTO: 1.04 10*3/MM3 (ref 0.7–3.1)
LYMPHOCYTES NFR BLD AUTO: 11.6 % (ref 19.6–45.3)
MAGNESIUM SERPL-MCNC: 2.7 MG/DL (ref 1.6–2.6)
MCH RBC QN AUTO: 28 PG (ref 26.6–33)
MCHC RBC AUTO-ENTMCNC: 30.3 G/DL (ref 31.5–35.7)
MCV RBC AUTO: 92.4 FL (ref 79–97)
MONOCYTES # BLD AUTO: 1.47 10*3/MM3 (ref 0.1–0.9)
MONOCYTES NFR BLD AUTO: 16.3 % (ref 5–12)
NEUTROPHILS NFR BLD AUTO: 5.96 10*3/MM3 (ref 1.7–7)
NEUTROPHILS NFR BLD AUTO: 66.2 % (ref 42.7–76)
NRBC BLD AUTO-RTO: 0 /100 WBC (ref 0–0.2)
PHOSPHATE SERPL-MCNC: 2.6 MG/DL (ref 2.5–4.5)
PLATELET # BLD AUTO: 278 10*3/MM3 (ref 140–450)
PMV BLD AUTO: 10 FL (ref 6–12)
POTASSIUM SERPL-SCNC: 4.9 MMOL/L (ref 3.5–5.2)
PROT SERPL-MCNC: 8.1 G/DL (ref 6–8.5)
RBC # BLD AUTO: 2.5 10*6/MM3 (ref 4.14–5.8)
SODIUM SERPL-SCNC: 131 MMOL/L (ref 136–145)
WBC NRBC COR # BLD: 9 10*3/MM3 (ref 3.4–10.8)

## 2022-12-29 PROCEDURE — 80053 COMPREHEN METABOLIC PANEL: CPT | Performed by: FAMILY MEDICINE

## 2022-12-29 PROCEDURE — 85025 COMPLETE CBC W/AUTO DIFF WBC: CPT | Performed by: FAMILY MEDICINE

## 2022-12-29 PROCEDURE — 84100 ASSAY OF PHOSPHORUS: CPT | Performed by: FAMILY MEDICINE

## 2022-12-29 PROCEDURE — 25010000002 CEFAZOLIN 1-4 GM/50ML-% SOLUTION: Performed by: INTERNAL MEDICINE

## 2022-12-29 PROCEDURE — 99232 SBSQ HOSP IP/OBS MODERATE 35: CPT | Performed by: INTERNAL MEDICINE

## 2022-12-29 PROCEDURE — 83735 ASSAY OF MAGNESIUM: CPT | Performed by: INTERNAL MEDICINE

## 2022-12-29 RX ORDER — LIDOCAINE 50 MG/G
1 PATCH TOPICAL
Status: DISCONTINUED | OUTPATIENT
Start: 2022-12-29 | End: 2023-01-01 | Stop reason: HOSPADM

## 2022-12-29 RX ADMIN — Medication 10 ML: at 09:55

## 2022-12-29 RX ADMIN — TRAMADOL HYDROCHLORIDE 50 MG: 50 TABLET, COATED ORAL at 15:48

## 2022-12-29 RX ADMIN — HYDROCODONE BITARTRATE AND ACETAMINOPHEN 1 TABLET: 5; 325 TABLET ORAL at 08:18

## 2022-12-29 RX ADMIN — FERROUS SULFATE TAB 325 MG (65 MG ELEMENTAL FE) 325 MG: 325 (65 FE) TAB at 08:18

## 2022-12-29 RX ADMIN — NIFEDIPINE 60 MG: 60 TABLET, EXTENDED RELEASE ORAL at 09:45

## 2022-12-29 RX ADMIN — POLYETHYLENE GLYCOL 3350 17 G: 17 POWDER, FOR SOLUTION ORAL at 08:17

## 2022-12-29 RX ADMIN — Medication 10 ML: at 09:45

## 2022-12-29 RX ADMIN — CEFAZOLIN SODIUM 1 G: 1 INJECTION, SOLUTION INTRAVENOUS at 23:58

## 2022-12-29 RX ADMIN — CARVEDILOL 12.5 MG: 12.5 TABLET, FILM COATED ORAL at 08:18

## 2022-12-29 RX ADMIN — ACETAMINOPHEN 650 MG: 325 TABLET ORAL at 19:25

## 2022-12-29 RX ADMIN — TRAMADOL HYDROCHLORIDE 50 MG: 50 TABLET, COATED ORAL at 04:06

## 2022-12-29 RX ADMIN — CALCITRIOL CAPSULES 0.25 MCG 0.25 MCG: 0.25 CAPSULE ORAL at 08:18

## 2022-12-29 RX ADMIN — LIDOCAINE 1 PATCH: 50 PATCH CUTANEOUS at 09:54

## 2022-12-29 NOTE — PROGRESS NOTES
[facility]     Progress Note    Patient Name: Ghassan Browning  : 1990  MRN: [id number]  Primary Care Physician:  Paulina Cullen MD  Date of admission: 2022    Subjective   Subjective     Patient not seen, chart reviewed    Objective   Objective     Vitals:   Temp:  [98.1 °F (36.7 °C)-99.9 °F (37.7 °C)] 98.1 °F (36.7 °C)  Heart Rate:  [90-99] 91  Resp:  [16-18] 18  BP: (142-170)/(78-93) 142/78    Labs     Results from last 7 days   Lab Units 22  0542 22  0557 22  0803   WBC 10*3/mm3 10.25 10.83* 11.17*   HEMOGLOBIN g/dL 7.4* 7.6* 9.0*   HEMATOCRIT % 24.4* 24.1* 28.7*   PLATELETS 10*3/mm3 270 255 213         Results from last 7 days   Lab Units 22  0542 22  0557 22  0803   CREATININE mg/dL 7.67* 10.00* 8.71*                  No results found for: LDL      Results from last 7 days   Lab Units 22  0803   CRP mg/dL 17.50*        Assessment & Plan   Assessment / Plan     Active Hospital Problems:  Active Hospital Problems    Diagnosis    • **Sepsis, due to unspecified organism, unspecified whether acute organ dysfunction present ([facility])    • Pericardial effusion    • ESRD (end stage renal disease) ([facility])          Assessment/Plan:    I reviewed his CT images and duplex images.  AVF is patent. Central vein stents patent. Severe CHF suggested  AVF has area of hematoma/heterogenous fluid- this could be infectious although no way to confirm  His blood cultures are negative now.  If he has persistent bacteremia/infection and no other source obvious, then will explore fistula as last resort. This would likely result in AVF sacrifice or it not being usable for sometime that would be challenging in him from access standpoint.  Will follow along.  Electronically signed by Royce Sofia MD, 22, 7:11 PM EST.

## 2022-12-29 NOTE — PROGRESS NOTES
[facility]   Hospitalist Progress Note    Date of admission: 12/21/2022  Patient Name: Ghassan Browning  1990  Date: 12/29/2022      Subjective     Chief Complaint   Patient presents with   • Fever   • Generalized Body Aches   • Dizziness       Summary: 32 y.o. with ESRD on Tuesday Thursday Saturday hemodialysis, history of SVC syndrome with prior stenting, hydrocephalus with prior  shunt, presented with 4 days of worsening fevers and chills, found to have MSSA bacteremia, large pericardial effusion, COVID-19 infection on 12/20.  Having persistent fevers, concern for possible infected AV fistula, vascular surgery assisting with evaluation.    Interval Followup: No acute events overnight, patient complaining of left lower quadrant pain, KUB yesterday without obvious sources, patient states that this is in the area where he received heparin injections, no obvious masses identified, no erythema, no warmth    Review of Systems  No reported chest pain or palpitations  No reported nausea or vomiting  No further reported fevers or chills    Objective     Vitals:   Temp:  [97.5 °F (36.4 °C)-99.1 °F (37.3 °C)] 99 °F (37.2 °C)  Heart Rate:  [] 102  Resp:  [18] 18  BP: (123-160)/(69-90) 141/81    Physical Exam   General appearance: NAD,   Eyes: anicteric sclerae, moist conjunctivae; no lid-lag; PERRLA  HENT: Atraumatic; oropharynx clear with moist mucous membranes and no mucosal ulcerations; normal hard and soft palate, facial swelling noted  Neck: Trachea midline; FROM, supple, no thyromegaly or lymphadenopathy  Lungs: CTA, with normal respiratory effort and no intercostal retractions  CV: Regular Rate and Rhythm, no Murmurs, Rubs, or Gallops   Abdomen: Soft, non-tender; no masses or Heptosplenomegally  Extremities: No peripheral edema or extremity lymphadenopathy  Skin: Normal temperature, turgor and texture; no rash, ulcers or subcutaneous nodules  Psych: Appropriate affect, alert and oriented to person,  place and time  Neuro: CN II - XII intact no motor deficits, no sensory defecits, globally weak      Result Review:  Vital signs, labs and recent relevant imaging reviewed.      !Patient Home Medications Stored in Pharmacy, , Does not apply, BID  calcitriol, 0.25 mcg, Oral, Every Other Day  carvedilol, 12.5 mg, Oral, BID  ceFAZolin, 1 g, Intravenous, Q24H  ferrous sulfate, 325 mg, Oral, Daily With Breakfast  heparin (porcine), 5,000 Units, Subcutaneous, Q8H  lidocaine, 1 patch, Transdermal, Q24H  lidocaine-prilocaine, 1 application, Topical, Once per day on Mon Wed Fri  losartan, 100 mg, Oral, Nightly  NIFEdipine XL, 60 mg, Oral, Daily  polyethylene glycol, 17 g, Oral, BID  sodium chloride, 10 mL, Intravenous, Q12H        •  acetaminophen  •  aluminum-magnesium hydroxide-simethicone  •  HYDROcodone-acetaminophen  •  melatonin  •  nitroglycerin  •  polyethylene glycol  •  sodium chloride  •  sodium chloride  •  sodium chloride  •  traMADol      Assessment / Plan     Assessment/Plan:  MSSA bacteremia  Sepsis secondary to MSSA bacteremia, suspect secondary to left lower lobe pneumonia   COVID-19 pneumonia  Diastolic CHF with borderline reduced EF 46 to 50%, grade 2 diastolic dysfunction  Large, greater than 2 cm circumferential pericardial effusion without tamponade  Hydrocephalus with  shunt  History of SVC syndrome with prior stenting approximately 2016  ESRD on hemodialysis  Chronic normocytic anemia  Hypertension  Iron deficiency anemia  Severe protein calorie malnutrition  Lactic acidosis, clinically significant  BRIAN on previously on NIPPV, not recently given unit recalled   Difficult social status: Homeless and recently from a 10  GERD  Severe hypophosphatemia    -Continue IV cefazolin for MSSA bacteremia/pneumonia, repeat blood cultures ordered and negative to date, blood culture obtained from fistula, if cultures return positive will need ABRIL  -- White count stable, temperature normal, last fever 1227  -   oscar/Vascular surgery assisting evaluation, right upper extremity AV fistula ultrasound reviewed, vascular surgery states operating on fistula as a last resort and no other sources of infection as patient would likely lose his dialysis access. CT chest evaluated showing patent SVC some chronic atrophic obstruction in the right general jugular vein, notable body wall edema and lymphadenopathy most notable in right axilla suspect this is likely congestive in setting of poor return.  -RT Case management consult, try CPAP at night, otherwise likely will need nocturnal oxygen  - Hemoglobin variable, decreased most recently, check anemia studies, patient has component of iron deficiency and likely anemia of chronic disease, monitor for bleeding needs outpatient scoping  -Continue hemodialysis,, nephrology following  - Continue oral Norco, add lidocaine patch to left lower quadrant where patient received heparin injections as he is complaining with pain  --KUB yesterday with no obvious obstruction, no dilated bowels  - Continue Coreg nifedipine and losartan, monitor blood pressure, blood pressure improved with mildly elevated  -Monitor replace electrolytes as needed  - Continue to monitor labs    Continue inpatient monitoring and treatment.    DVT prophylaxis: Start on heparin DVT prophylaxis     Code Status (Patient has no pulse and is not breathing): CPR (Attempt to Resuscitate)  Medical Interventions (Patient has pulse or is breathing): Full Support        CBC    CBC 12/26/22 12/27/22 12/28/22   WBC 11.17 (A) 10.83 (A) 10.25   RBC 3.16 (A) 2.67 (A) 2.62 (A)   Hemoglobin 9.0 (A) 7.6 (A) 7.4 (A)   Hematocrit 28.7 (A) 24.1 (A) 24.4 (A)   MCV 90.8 90.3 93.1   MCH 28.5 28.5 28.2   MCHC 31.4 (A) 31.5 30.3 (A)   RDW 20.6 (A) 20.4 (A) 20.7 (A)   Platelets 213 255 270   (A) Abnormal value              CMP    CMP 12/26/22 12/27/22 12/28/22   Glucose 109 (A) 146 (A) 85   BUN 73 (A) 85 (A) 61 (A)   Creatinine 8.71 (A) 10.00 (A)  7.67 (A)   eGFR 7.6 (A) 6.4 (A) 8.8 (A)   Sodium 133 (A) 126 (A) 131 (A)   Potassium 4.6 4.6 4.7   Chloride 90 (A) 85 (A) 89 (A)   Calcium 8.1 (A) 6.8 (A) 7.8 (A)   Total Protein 8.1 7.7 8.1   Albumin 3.60 3.3 (A) 3.7   Globulin 4.5 4.4 4.4   Total Bilirubin 0.3 0.3 0.4   Alkaline Phosphatase 134 (A) 96 99   AST (SGOT) 17 16 18   ALT (SGPT) <5 <5 <5   Albumin/Globulin Ratio 0.8 0.8 0.8   BUN/Creatinine Ratio 8.4 8.5 8.0   Anion Gap 16.5 (A) 16.4 (A) 12.6   (A) Abnormal value       Comments are available for some flowsheets but are not being displayed.             Reviewed patients labs and imaging, and discussed with patient and nurse at bedside.      Electronically signed by Ramón Murray MD, 12/29/22, 9:05 AM EST.

## 2022-12-29 NOTE — PROGRESS NOTES
[facility]     Nephrology Progress Note      Patient Name: Ghassan Browning  : 1990  MRN: [id number]  Primary Care Physician:  Paulina Cullen MD  Date of admission: 2022    Subjective   Subjective     Interval History:  Patient Reports feeling better but still weak and tired.   Having some pain in access arm. No nausea or vomiting.  Tolerating p.o.   Some intermittent cough, COVID-positive and positive staph bacteremia.  Reports some stiffness/pain in abd with previous heparin injections    Review of Systems   All systems were reviewed and negative except for: What is mentioned above.    Objective   Objective     Vitals:   Temp:  [97.5 °F (36.4 °C)-99.1 °F (37.3 °C)] 99 °F (37.2 °C)  Heart Rate:  [] 102  Resp:  [18] 18  BP: (123-160)/(69-90) 141/81  Physical Exam:      Constitutional: Awake, alert, no distress, conversant and pleasant, sitting in bed.  Intermittent cough.              Eyes: sclerae anicteric, no conjunctival injection              HENT: mucous membranes moist              Neck: Supple, no thyromegaly, no lymphadenopathy, trachea midline, No JVD              Respiratory: Decreased to auscultation bilaterally, nonlabored respirations               Cardiovascular: RRR, no murmurs, rubs, or gallops.              Gastrointestinal: Positive bowel sounds, soft, nontender, nondistended              Musculoskeletal: trace edema, no clubbing or cyanosis, right upper extremity AV graft, patent.              Psychiatric: Appropriate affect, cooperative              Neurologic: Oriented x 3, moving all extremities, Cranial Nerves grossly intact, speech clear              Skin: warm and dry, no rashes.  No induration/rash on abd in area of pain.    Result Review    Result Reviewed:  I have personally reviewed the results from the time of this admission to 2022 09:57 EST and agree with these findings:  [x]  Laboratory  []  Microbiology  [x]  Radiology  []  EKG/Telemetry   []   Cardiology/Vascular   []  Pathology  []  Old records  []  Other:  Lab Results   Component Value Date    GLUCOSE 85 12/28/2022    CALCIUM 7.8 (L) 12/28/2022     (L) 12/28/2022    K 4.7 12/28/2022    CO2 29.4 (H) 12/28/2022    CL 89 (L) 12/28/2022    BUN 61 (H) 12/28/2022    CREATININE 7.67 (H) 12/28/2022    BCR 8.0 12/28/2022    ANIONGAP 12.6 12/28/2022     Lab Results   Component Value Date    CALCIUM 7.8 (L) 12/28/2022    PHOS 2.2 (L) 12/28/2022      Results from last 7 days   Lab Units 12/28/22  0542   MAGNESIUM mg/dL 2.6              Assessment & Plan   Assessment / Plan       Active Hospital Problems:  Active Hospital Problems    Diagnosis    • **Sepsis, due to unspecified organism, unspecified whether acute organ dysfunction present ([facility])    • Pericardial effusion    • ESRD (end stage renal disease) ([facility])        Assessment and Plan:    - End-stage renal disease, dialysis TTS schedule through right upper extremity AV graft.  Continue same schedule.  UF as tolerated.  - History of SVC syndrome.  - COVID pneumonia.  --MSSA bacteremia, on antibiotics per primary.  Had previously had vascular stents.  AV graft seems to be working well.  He notes some drainage from AVG, concern that may be graft infection.  Repeat blood cx from dialysis no growth.  Vascular following.  Evaluating for source now.  - Hypertension, with hypotension.  Monitor for now.  - Hyponatremia with hypervolemia, continue 2000 cc p.o. fluid restriction per day and try to correct with dialysis.  - Metabolic acidosis, mild.  Improved after dialysis.  - History of  shunt.  - Large pericardial effusion, evaluated by cardiology.  No tamponade.  45 to 50% EF.  Severe LVH with diastolic dysfunction.  - Hyperphosphatemia, then hypophosphatemia.  Replaced IV yesterday, had dialysis, still low this morning.  Getting additional replacement of phosphorus.  Not on low phosphorus diet.  Continue calcitriol 0.25 mcg on dialysis days per home dosing.

## 2022-12-29 NOTE — PLAN OF CARE
Goal Outcome Evaluation:  Plan of Care Reviewed With: patient           Outcome Evaluation: patient sitting in bed, pain med given for abdominal pain, patient alert and oriented, call light within reach.pt to have dialysis today 12-29-22

## 2022-12-30 LAB
ABO GROUP BLD: NORMAL
ABO GROUP BLD: NORMAL
ALBUMIN SERPL-MCNC: 3.4 G/DL (ref 3.5–5.2)
ALBUMIN/GLOB SERPL: 0.7 G/DL
ALP SERPL-CCNC: 87 U/L (ref 39–117)
ALT SERPL W P-5'-P-CCNC: <5 U/L (ref 1–41)
ANION GAP SERPL CALCULATED.3IONS-SCNC: 10 MMOL/L (ref 5–15)
AST SERPL-CCNC: 16 U/L (ref 1–40)
BASOPHILS # BLD AUTO: 0.03 10*3/MM3 (ref 0–0.2)
BASOPHILS NFR BLD AUTO: 0.3 % (ref 0–1.5)
BILIRUB SERPL-MCNC: 0.3 MG/DL (ref 0–1.2)
BLD GP AB SCN SERPL QL: NEGATIVE
BUN SERPL-MCNC: 47 MG/DL (ref 6–20)
BUN/CREAT SERPL: 6.7 (ref 7–25)
CALCIUM SPEC-SCNC: 8.2 MG/DL (ref 8.6–10.5)
CHLORIDE SERPL-SCNC: 91 MMOL/L (ref 98–107)
CO2 SERPL-SCNC: 28 MMOL/L (ref 22–29)
CREAT SERPL-MCNC: 7 MG/DL (ref 0.76–1.27)
DEPRECATED RDW RBC AUTO: 68.7 FL (ref 37–54)
EGFRCR SERPLBLD CKD-EPI 2021: 9.9 ML/MIN/1.73
EOSINOPHIL # BLD AUTO: 0.38 10*3/MM3 (ref 0–0.4)
EOSINOPHIL NFR BLD AUTO: 4 % (ref 0.3–6.2)
ERYTHROCYTE [DISTWIDTH] IN BLOOD BY AUTOMATED COUNT: 20.2 % (ref 12.3–15.4)
GLOBULIN UR ELPH-MCNC: 5.2 GM/DL
GLUCOSE SERPL-MCNC: 93 MG/DL (ref 65–99)
HCT VFR BLD AUTO: 22.7 % (ref 37.5–51)
HGB BLD-MCNC: 6.9 G/DL (ref 13–17.7)
IMM GRANULOCYTES # BLD AUTO: 0.07 10*3/MM3 (ref 0–0.05)
IMM GRANULOCYTES NFR BLD AUTO: 0.7 % (ref 0–0.5)
LYMPHOCYTES # BLD AUTO: 0.79 10*3/MM3 (ref 0.7–3.1)
LYMPHOCYTES NFR BLD AUTO: 8.3 % (ref 19.6–45.3)
MAGNESIUM SERPL-MCNC: 2.2 MG/DL (ref 1.6–2.6)
MCH RBC QN AUTO: 28.2 PG (ref 26.6–33)
MCHC RBC AUTO-ENTMCNC: 30.4 G/DL (ref 31.5–35.7)
MCV RBC AUTO: 92.7 FL (ref 79–97)
MONOCYTES # BLD AUTO: 1.15 10*3/MM3 (ref 0.1–0.9)
MONOCYTES NFR BLD AUTO: 12.1 % (ref 5–12)
NEUTROPHILS NFR BLD AUTO: 7.06 10*3/MM3 (ref 1.7–7)
NEUTROPHILS NFR BLD AUTO: 74.6 % (ref 42.7–76)
NRBC BLD AUTO-RTO: 0 /100 WBC (ref 0–0.2)
PHOSPHATE SERPL-MCNC: 2.2 MG/DL (ref 2.5–4.5)
PLATELET # BLD AUTO: 280 10*3/MM3 (ref 140–450)
PMV BLD AUTO: 9.5 FL (ref 6–12)
POTASSIUM SERPL-SCNC: 5.3 MMOL/L (ref 3.5–5.2)
PROT SERPL-MCNC: 8.6 G/DL (ref 6–8.5)
RBC # BLD AUTO: 2.45 10*6/MM3 (ref 4.14–5.8)
RH BLD: POSITIVE
RH BLD: POSITIVE
SODIUM SERPL-SCNC: 129 MMOL/L (ref 136–145)
T&S EXPIRATION DATE: NORMAL
WBC NRBC COR # BLD: 9.48 10*3/MM3 (ref 3.4–10.8)

## 2022-12-30 PROCEDURE — 99233 SBSQ HOSP IP/OBS HIGH 50: CPT | Performed by: INTERNAL MEDICINE

## 2022-12-30 PROCEDURE — P9016 RBC LEUKOCYTES REDUCED: HCPCS

## 2022-12-30 PROCEDURE — 84100 ASSAY OF PHOSPHORUS: CPT | Performed by: FAMILY MEDICINE

## 2022-12-30 PROCEDURE — 86901 BLOOD TYPING SEROLOGIC RH(D): CPT | Performed by: HOSPITALIST

## 2022-12-30 PROCEDURE — 83735 ASSAY OF MAGNESIUM: CPT | Performed by: INTERNAL MEDICINE

## 2022-12-30 PROCEDURE — 86900 BLOOD TYPING SEROLOGIC ABO: CPT

## 2022-12-30 PROCEDURE — 80053 COMPREHEN METABOLIC PANEL: CPT | Performed by: FAMILY MEDICINE

## 2022-12-30 PROCEDURE — 86901 BLOOD TYPING SEROLOGIC RH(D): CPT

## 2022-12-30 PROCEDURE — 25010000002 CEFAZOLIN 1-4 GM/50ML-% SOLUTION: Performed by: INTERNAL MEDICINE

## 2022-12-30 PROCEDURE — 25010000002 HEPARIN (PORCINE) PER 1000 UNITS: Performed by: INTERNAL MEDICINE

## 2022-12-30 PROCEDURE — 86923 COMPATIBILITY TEST ELECTRIC: CPT

## 2022-12-30 PROCEDURE — 36430 TRANSFUSION BLD/BLD COMPNT: CPT

## 2022-12-30 PROCEDURE — 99232 SBSQ HOSP IP/OBS MODERATE 35: CPT | Performed by: SURGERY

## 2022-12-30 PROCEDURE — 86900 BLOOD TYPING SEROLOGIC ABO: CPT | Performed by: HOSPITALIST

## 2022-12-30 PROCEDURE — 86850 RBC ANTIBODY SCREEN: CPT | Performed by: HOSPITALIST

## 2022-12-30 PROCEDURE — 85025 COMPLETE CBC W/AUTO DIFF WBC: CPT | Performed by: FAMILY MEDICINE

## 2022-12-30 RX ORDER — BENZONATATE 100 MG/1
200 CAPSULE ORAL ONCE
Status: DISCONTINUED | OUTPATIENT
Start: 2022-12-30 | End: 2022-12-30

## 2022-12-30 RX ADMIN — TRAMADOL HYDROCHLORIDE 50 MG: 50 TABLET, COATED ORAL at 08:14

## 2022-12-30 RX ADMIN — LOSARTAN POTASSIUM 100 MG: 50 TABLET, FILM COATED ORAL at 00:01

## 2022-12-30 RX ADMIN — Medication 10 ML: at 00:03

## 2022-12-30 RX ADMIN — CARVEDILOL 12.5 MG: 12.5 TABLET, FILM COATED ORAL at 08:07

## 2022-12-30 RX ADMIN — POLYETHYLENE GLYCOL 3350 17 G: 17 POWDER, FOR SOLUTION ORAL at 21:58

## 2022-12-30 RX ADMIN — NIFEDIPINE 60 MG: 60 TABLET, EXTENDED RELEASE ORAL at 08:07

## 2022-12-30 RX ADMIN — HYDROCODONE BITARTRATE AND ACETAMINOPHEN 1 TABLET: 5; 325 TABLET ORAL at 22:06

## 2022-12-30 RX ADMIN — HYDROCODONE BITARTRATE AND ACETAMINOPHEN 1 TABLET: 5; 325 TABLET ORAL at 06:52

## 2022-12-30 RX ADMIN — Medication 10 ML: at 08:08

## 2022-12-30 RX ADMIN — TRAMADOL HYDROCHLORIDE 50 MG: 50 TABLET, COATED ORAL at 18:09

## 2022-12-30 RX ADMIN — ALUMINUM HYDROXIDE, MAGNESIUM HYDROXIDE, AND DIMETHICONE 15 ML: 400; 400; 40 SUSPENSION ORAL at 14:54

## 2022-12-30 RX ADMIN — LOSARTAN POTASSIUM 100 MG: 50 TABLET, FILM COATED ORAL at 21:58

## 2022-12-30 RX ADMIN — FERROUS SULFATE TAB 325 MG (65 MG ELEMENTAL FE) 325 MG: 325 (65 FE) TAB at 08:07

## 2022-12-30 RX ADMIN — POLYETHYLENE GLYCOL 3350 17 G: 17 POWDER, FOR SOLUTION ORAL at 00:01

## 2022-12-30 RX ADMIN — LIDOCAINE AND PRILOCAINE 1 APPLICATION: 25; 25 CREAM TOPICAL at 08:11

## 2022-12-30 RX ADMIN — CARVEDILOL 12.5 MG: 12.5 TABLET, FILM COATED ORAL at 00:02

## 2022-12-30 RX ADMIN — CARVEDILOL 12.5 MG: 12.5 TABLET, FILM COATED ORAL at 21:58

## 2022-12-30 RX ADMIN — POLYETHYLENE GLYCOL 3350 17 G: 17 POWDER, FOR SOLUTION ORAL at 08:07

## 2022-12-30 RX ADMIN — TRAMADOL HYDROCHLORIDE 50 MG: 50 TABLET, COATED ORAL at 00:07

## 2022-12-30 RX ADMIN — CEFAZOLIN SODIUM 1 G: 1 INJECTION, SOLUTION INTRAVENOUS at 21:59

## 2022-12-30 RX ADMIN — Medication 10 ML: at 21:59

## 2022-12-30 NOTE — PROGRESS NOTES
[facility]     Nephrology Progress Note      Patient Name: Ghassan Browning  : 1990  MRN: [id number]  Primary Care Physician:  Paulina Cullen MD  Date of admission: 2022    Subjective   Subjective     Interval History:  Patient Reports feeling better but still weak and tired.   Pain in abd/arm resolved now. No nausea or vomiting.  Tolerating p.o.   Some intermittent cough, COVID-positive and positive staph bacteremia.    Review of Systems   All systems were reviewed and negative except for: What is mentioned above.    Objective   Objective     Vitals:   Temp:  [97.9 °F (36.6 °C)-101.1 °F (38.4 °C)] 98 °F (36.7 °C)  Heart Rate:  [] 90  Resp:  [16-18] 16  BP: (111-150)/(64-94) 130/71  Physical Exam:      Constitutional: Awake, alert, no distress, conversant and pleasant, sitting in bed.  Intermittent cough.              Eyes: sclerae anicteric, no conjunctival injection              HENT: mucous membranes moist              Neck: Supple, no thyromegaly, no lymphadenopathy, trachea midline, No JVD              Respiratory: Decreased to auscultation bilaterally, nonlabored respirations               Cardiovascular: RRR, no murmurs, rubs, or gallops.              Gastrointestinal: Positive bowel sounds, soft, nontender, nondistended              Musculoskeletal: trace edema, no clubbing or cyanosis, right upper extremity AV graft, patent.              Psychiatric: Appropriate affect, cooperative              Neurologic: Oriented x 3, moving all extremities, Cranial Nerves grossly intact, speech clear              Skin: warm and dry, no rashes.  No induration/rash on abd in area of pain.    Result Review    Result Reviewed:  I have personally reviewed the results from the time of this admission to 2022 14:28 EST and agree with these findings:  [x]  Laboratory  []  Microbiology  [x]  Radiology  []  EKG/Telemetry   []  Cardiology/Vascular   []  Pathology  []  Old records  []  Other:  Lab  Results   Component Value Date    GLUCOSE 93 12/30/2022    CALCIUM 8.2 (L) 12/30/2022     (L) 12/30/2022    K 5.3 (H) 12/30/2022    CO2 28.0 12/30/2022    CL 91 (L) 12/30/2022    BUN 47 (H) 12/30/2022    CREATININE 7.00 (H) 12/30/2022    BCR 6.7 (L) 12/30/2022    ANIONGAP 10.0 12/30/2022     Lab Results   Component Value Date    CALCIUM 8.2 (L) 12/30/2022    PHOS 2.2 (L) 12/30/2022      Results from last 7 days   Lab Units 12/30/22  0617   MAGNESIUM mg/dL 2.2              Assessment & Plan   Assessment / Plan       Active Hospital Problems:  Active Hospital Problems    Diagnosis    • **Sepsis, due to unspecified organism, unspecified whether acute organ dysfunction present ([facility])    • Pericardial effusion    • ESRD (end stage renal disease) ([facility])        Assessment and Plan:    - End-stage renal disease, dialysis TTS schedule through right upper extremity AV graft.  Continue same schedule.  UF as tolerated.  - History of SVC syndrome.  - COVID pneumonia.  --MSSA bacteremia, on antibiotics per primary.  Had previously had vascular stents.  AV graft seems to be working well.  He notes some drainage from AVG, concern that may be graft infection.  Repeat blood cx from dialysis no growth.  Vascular following.  Evaluating for source now.  - Hypertension, with hypotension.  Monitor for now.  - Hyponatremia with hypervolemia, continue 2000 cc p.o. fluid restriction per day and try to correct with dialysis.  - Metabolic acidosis, mild.  Improved after dialysis.  - History of  shunt.  - Large pericardial effusion, evaluated by cardiology.  No tamponade.  45 to 50% EF.  Severe LVH with diastolic dysfunction.  - Hyperphosphatemia, then hypophosphatemia.  Replaced IV yesterday, had dialysis, still low this morning.  Getting additional replacement of phosphorus.  Not on low phosphorus diet.  Continue calcitriol 0.25 mcg on dialysis days per home dosing.   --Anemia-  Getting prbc's now.  Monitor.

## 2022-12-30 NOTE — PROGRESS NOTES
[facility]     Progress Note    Patient Name: Ghassan Browning  : 1990  MRN: [id number]  Primary Care Physician:  Paulina Cullen MD  Date of admission: 2022    Subjective   Subjective     Patient seen and examined today.  He seems to be comfortable.  He did have a fever spike.  He says it was short-lived and improved quickly.  He tells me that his right arm pain has improved.  Also does not feel as warm as it did before.    Objective   Objective     Vitals:   Temp:  [98 °F (36.7 °C)-101.1 °F (38.4 °C)] 98.1 °F (36.7 °C)  Heart Rate:  [] 97  Resp:  [16-18] 16  BP: (111-150)/(64-94) 136/71    Labs     Results from last 7 days   Lab Units 22  0617 22  0953 22  0542   WBC 10*3/mm3 9.48 9.00 10.25   HEMOGLOBIN g/dL 6.9* 7.0* 7.4*   HEMATOCRIT % 22.7* 23.1* 24.4*   PLATELETS 10*3/mm3 280 278 270         Results from last 7 days   Lab Units 22  0617 22  0953 22  0542   CREATININE mg/dL 7.00* 9.75* 7.67*                  No results found for: LDL      Results from last 7 days   Lab Units 22  0803   CRP mg/dL 17.50*        Physical Exam   Right arm actually has an AV graft in the upper arm.  Most likely there was a prior fistula and was converted to a graft.  There is significant induration around the graft.  Mild increase in temperature.  No edema on the arm.        Assessment & Plan   Assessment / Plan     Active Hospital Problems:  Active Hospital Problems    Diagnosis    • **Sepsis, due to unspecified organism, unspecified whether acute organ dysfunction present ([facility])    • Pericardial effusion    • ESRD (end stage renal disease) ([facility])          Assessment/Plan:    Right upper arm AV graft with ongoing fever spikes.  Blood cultures have been negative now.  He will need close monitoring.  With this being a prosthetic graft, risk of infection is higher.  He does have significant induration and some irregular fluid/hematoma on the ultrasound.  This could be a  chronic underlying infection.  If he has positive blood cultures again or any signs of sepsis, we will have to explore the graft and likely remove it.  The patient would like to avoid that understandably.  We will continue to follow.    Electronically signed by Royce Sofia MD, 12/30/22, 5:52 PM EST.

## 2022-12-30 NOTE — NURSING NOTE
Patient tolerated hd tx.  Patient stable.  Patient was educated on staying still not to infiltrate needles while on hd tx.  Patient removed 3L     Pre vitals  BP  143/67  HR  98      Post vitals  BP  151/77  HR  98    Mani Varela RN  [facility]

## 2022-12-30 NOTE — CONSULTS
"Nutrition Services    Patient Name: Ghassan Browning  YOB: 1990  MRN: [id number]  Admission date: 12/21/2022      CLINICAL NUTRITION ASSESSMENT      Reason for Assessment  Follow-up protocol     H&P:    Past Medical History:   Diagnosis Date   • Bronchitis    • Chronic pain    • Contusion, hip    • ESRD on dialysis ([facility])    • Headache    • History of brain shunt    • Hypertension    • Kidney disease    • Sleep apnea    • Wrist sprain         Current Problems:   Active Hospital Problems    Diagnosis    • **Sepsis, due to unspecified organism, unspecified whether acute organ dysfunction present ([facility])    • Pericardial effusion    • ESRD (end stage renal disease) ([facility])         Nutrition/Diet History         Narrative     RD follow up for high risk assessment. Pt was sleeping when RD arrived, did not wake to verbal stimulation. Pt has two empty [employer] Renal ONS and one new one from lunch untouched. RN obtained new weight today, gain 3 lbs however possibly r/t fluid status. Pt continues on 2000 mL/d fluid restriction, HD on TTS. Will continue to monitor per protocol.         Anthropometrics        Current Height, Weight Height: 162.6 cm (64\")  Weight: 50.8 kg (111 lb 15.9 oz)   Current BMI Body mass index is 19.22 kg/m².       Weight Hx  Wt Readings from Last 30 Encounters:   12/30/22 1341 50.8 kg (111 lb 15.9 oz)   12/24/22 0950 49 kg (108 lb 0.4 oz)   12/21/22 0511 52.2 kg (115 lb 1.3 oz)   12/21/22 0141 52.6 kg (115 lb 15.4 oz)            Wt Change Observation Weight more consistent w/ reported UBW     Estimated/Assessed Needs       Energy Requirements 35 kcal/kg   EST Needs (kcal/day) 1715       Protein Requirements 1-1.2 g/kg   EST Daily Needs (g/day) 49-59       Fluid Requirements     Estimated Needs (mL/day) 2000 mL/d fluid restriction     Labs/Medications         Pertinent Labs Reviewed.   Results from last 7 days   Lab Units 12/30/22  0617 12/29/22  0953 12/28/22  0542   SODIUM mmol/L 129* 131* " 131*   POTASSIUM mmol/L 5.3* 4.9 4.7   CHLORIDE mmol/L 91* 88* 89*   CO2 mmol/L 28.0 28.7 29.4*   BUN mg/dL 47* 82* 61*   CREATININE mg/dL 7.00* 9.75* 7.67*   CALCIUM mg/dL 8.2* 7.7* 7.8*   BILIRUBIN mg/dL 0.3 0.3 0.4   ALK PHOS U/L 87 88 99   ALT (SGPT) U/L <5 <5 <5   AST (SGOT) U/L 16 15 18   GLUCOSE mg/dL 93 110* 85     Results from last 7 days   Lab Units 12/30/22  0617 12/29/22  0953 12/28/22  0542   MAGNESIUM mg/dL 2.2 2.7* 2.6   PHOSPHORUS mg/dL 2.2* 2.6 2.2*   HEMOGLOBIN g/dL 6.9* 7.0* 7.4*   HEMATOCRIT % 22.7* 23.1* 24.4*     COVID19   Date Value Ref Range Status   12/20/2022 Detected (C) Not Detected - Ref. Range Final     Lab Results   Component Value Date    HGBA1C 5.0 11/26/2021         Pertinent Medications Reviewed.     Current Nutrition Orders & Evaluation of Intake       Oral Nutrition     Current PO Diet Diet: Renal Diets, Fluid Restriction (240 mL/tray) Diets; Low Potassium; 2000 mL/day; Texture: Regular Texture (IDDSI 7); Fluid Consistency: Thin (IDDSI 0)   Supplement Orders Placed This Encounter      Dietary Nutrition Supplements [facility]      DIET MESSAGE No eggs to eat; No pork Grape or cranberry juice to drink with meals, sweet tea, Sprite, or ginger ale also acceptable      DIET MESSAGE PLEASE PROVIDE PT WITH MENU ON HIS DINNER TRAY       Malnutrition Severity Assessment                Nutrition Diagnosis         Nutrition Dx Problem 1 Increased nutrient needs related to endocrine dysfunction and catabolic illness as evidenced by ESRD on HD, Covid (+), hx PCM, and BMI 18.5.       Nutrition Intervention         [facility] w/ meals     Medical Nutrition Therapy/Nutrition Education          Learner     Readiness N/A  Education not indicated at this time     Method     Response N/A  N/A     Monitor/Evaluation        Monitor PO intake, Supplement intake, Pertinent labs, Weight       Nutrition Discharge Plan         To be determined       Electronically signed  by:  Padmini Canela RD  12/30/22 15:36 EST

## 2022-12-30 NOTE — SIGNIFICANT NOTE
12/30/22 1038   Coping/Psychosocial   Additional Documentation Spiritual Care (Group)   Spiritual Care   Spiritual Care Source  initiative   Response to Spiritual Care receptive of support   Spiritual Care Interventions supportive conversation provided   Spiritual Care Visit Type initial   Spiritual Care Request coping/stress of illness support;spiritual/moral support   Receptivity to Spiritual Care visit welcomed

## 2022-12-30 NOTE — PLAN OF CARE
Goal Outcome Evaluation:  Plan of Care Reviewed With: patient        Progress: no change  Outcome Evaluation: patient received dialysis this shift. 3L removed, patient resting in bed, no c/o pain or discomfort at this time. patient is alert and oriented, call light within reach.

## 2022-12-30 NOTE — PROGRESS NOTES
[facility]   Hospitalist Progress Note  Date: 2022  Patient Name: Ghassan Browning  : 1990  MRN: [id number]  Date of admission: 2022      Subjective   Subjective     Chief Complaint: Follow up for fever and dizziness    Summary: 32 y.o. with ESRD on  hemodialysis, history of SVC syndrome with prior stenting, hydrocephalus with prior  shunt, presented with 4 days of worsening fevers and chills, found to have MSSA bacteremia, large pericardial effusion, COVID-19 infection on .  Having persistent fevers, concern for possible infected AV fistula, vascular surgery assisting with evaluation.    Interval Followup: Febrile 101.1 Fahrenheit this morning.  Feeling fine.  Denies chills.  Reports improvement in abdominal pain with lidocaine patch.  Had a bowel movement today.  No bright red blood or melanic stools.     Review of Systems  Constitutional:  +Fever, No Chills, No Fatigue  Cardiovascular:  No Chest Pain,No Palpitations  Respiratory:  No Cough, No Dyspnea  Gastrointestinal:  No Nausea, No Vomiting  : No dysuria or suprapubic pain      Objective   Objective     Vitals:   Temp:  [97.9 °F (36.6 °C)-101.1 °F (38.4 °C)] 101.1 °F (38.4 °C)  Heart Rate:  [] 106  Resp:  [16-18] 18  BP: (111-150)/(64-94) 149/84  Physical Exam    Constitutional: Well-developed male, resting in bed, NAD   Eyes: Pupils equal and reactive, no conjunctival injection   HENT: NCAT, nares patent, MMM   Neck: Supple, trachea midline   Respiratory: Clear to auscultation bilaterally, nonlabored respirations    Cardiovascular: RRR, no murmurs, no pedal edema   Gastrointestinal: Positive bowel sounds, soft, nontender, nondistended   Musculoskeletal: No joint deformities, no clubbing or cyanosis to extremities   Psychiatric: Appropriate affect, cooperative   Neurologic: Alert, Cranial Nerves grossly intact speech clear   Skin: Warm and dry, no rashes     Result Review    Result Review:  I have  personally reviewed the following over the last 24 hours (07:00 to 07:00) and agree with the following findings  [x]  Laboratory   CBC    CBC 12/28/22 12/29/22 12/30/22   WBC 10.25 9.00 9.48   RBC 2.62 (A) 2.50 (A) 2.45 (A)   Hemoglobin 7.4 (A) 7.0 (A) 6.9 (A)   Hematocrit 24.4 (A) 23.1 (A) 22.7 (A)   MCV 93.1 92.4 92.7   MCH 28.2 28.0 28.2   MCHC 30.3 (A) 30.3 (A) 30.4 (A)   RDW 20.7 (A) 20.4 (A) 20.2 (A)   Platelets 270 278 280   (A) Abnormal value            BMP    BMP 12/28/22 12/29/22 12/30/22   BUN 61 (A) 82 (A) 47 (A)   Creatinine 7.67 (A) 9.75 (A) 7.00 (A)   Sodium 131 (A) 131 (A) 129 (A)   Potassium 4.7 4.9 5.3 (A)   Chloride 89 (A) 88 (A) 91 (A)   CO2 29.4 (A) 28.7 28.0   Calcium 7.8 (A) 7.7 (A) 8.2 (A)   (A) Abnormal value            [x]  Microbiology  Repeat peripheral blood culture remain negative  []  Radiology  []  EKG/Telemetry   [x]  Cardiology/Vascular TTE EF 45 to 50%, grade 2 diastolic dysfunction, elevated right ventricular systolic pressure, large circumferential pericardial effusion without evidence of tamponade  []  Pathology  [x]  Old records previous progress note  []  Other:    Assessment & Plan   Assessment / Plan     Assessment:  · MSSA bacteremia  · Sepsis secondary to MSSA bacteremia, suspect secondary to left lower lobe pneumonia   · COVID-19 pneumonia  · Diastolic CHF with borderline reduced EF 46 to 50%, grade 2 diastolic dysfunction  · Large, greater than 2 cm circumferential pericardial effusion without tamponade  · Hydrocephalus with  shunt  · History of SVC syndrome with prior stenting approximately 2016  · ESRD on hemodialysis  · Chronic normocytic anemia  · Hypertension -controlled  · Iron deficiency anemia  · Severe protein calorie malnutrition  · Lactic acidosis, clinically significant  · BRIAN on previously on NIPPV, not recently given unit recalled   · Difficult social status: Homeless  · GERD  · Severe hypophosphatemia    Plan:    Spiked a fever this morning.  No  leukocytosis.  Not endorsing any new infectious symptoms. Repeat blood culture remain negative to date.  Continue IV cefazolin 1 g q24 hours. Monitor fever curve  Hemoglobin 6.9.  No signs of bleeding.  Transfusing 1 units PRBC. Follow-up response to transfusion  Continue oral iron replacement  Vascular surgery following, appreciate recommended  Continue HD; per nephrology  Continue lidocaine patch.  Continue as needed p.o. Norco regimen  Continue Coreg, losartan, nifedipine XL     Discussed plan with RN.    DVT prophylaxis: Heparin 5000 units every 5 hours  Medical and mechanical DVT prophylaxis orders are present.    CODE STATUS:   Code Status (Patient has no pulse and is not breathing): CPR (Attempt to Resuscitate)  Medical Interventions (Patient has pulse or is breathing): Full Support        Electronically signed by Richard Chi DO, 12/30/22, 8:43 AM EST.

## 2022-12-31 LAB
ANION GAP SERPL CALCULATED.3IONS-SCNC: 13.5 MMOL/L (ref 5–15)
BACTERIA SPEC AEROBE CULT: NORMAL
BACTERIA SPEC AEROBE CULT: NORMAL
BASOPHILS # BLD AUTO: 0.05 10*3/MM3 (ref 0–0.2)
BASOPHILS NFR BLD AUTO: 0.6 % (ref 0–1.5)
BH BB BLOOD EXPIRATION DATE: NORMAL
BH BB BLOOD TYPE BARCODE: 6200
BH BB DISPENSE STATUS: NORMAL
BH BB PRODUCT CODE: NORMAL
BH BB UNIT NUMBER: NORMAL
BUN SERPL-MCNC: 68 MG/DL (ref 6–20)
BUN/CREAT SERPL: 7.4 (ref 7–25)
CALCIUM SPEC-SCNC: 7.7 MG/DL (ref 8.6–10.5)
CHLORIDE SERPL-SCNC: 88 MMOL/L (ref 98–107)
CO2 SERPL-SCNC: 26.5 MMOL/L (ref 22–29)
CREAT SERPL-MCNC: 9.15 MG/DL (ref 0.76–1.27)
CROSSMATCH INTERPRETATION: NORMAL
DEPRECATED RDW RBC AUTO: 69.9 FL (ref 37–54)
EGFRCR SERPLBLD CKD-EPI 2021: 7.2 ML/MIN/1.73
EOSINOPHIL # BLD AUTO: 0.36 10*3/MM3 (ref 0–0.4)
EOSINOPHIL NFR BLD AUTO: 4.1 % (ref 0.3–6.2)
ERYTHROCYTE [DISTWIDTH] IN BLOOD BY AUTOMATED COUNT: 21.3 % (ref 12.3–15.4)
GLUCOSE SERPL-MCNC: 109 MG/DL (ref 65–99)
HCT VFR BLD AUTO: 26 % (ref 37.5–51)
HGB BLD-MCNC: 8.1 G/DL (ref 13–17.7)
IMM GRANULOCYTES # BLD AUTO: 0.05 10*3/MM3 (ref 0–0.05)
IMM GRANULOCYTES NFR BLD AUTO: 0.6 % (ref 0–0.5)
LYMPHOCYTES # BLD AUTO: 0.93 10*3/MM3 (ref 0.7–3.1)
LYMPHOCYTES NFR BLD AUTO: 10.6 % (ref 19.6–45.3)
MAGNESIUM SERPL-MCNC: 2.4 MG/DL (ref 1.6–2.6)
MCH RBC QN AUTO: 27.7 PG (ref 26.6–33)
MCHC RBC AUTO-ENTMCNC: 31.2 G/DL (ref 31.5–35.7)
MCV RBC AUTO: 89 FL (ref 79–97)
MONOCYTES # BLD AUTO: 1.35 10*3/MM3 (ref 0.1–0.9)
MONOCYTES NFR BLD AUTO: 15.4 % (ref 5–12)
NEUTROPHILS NFR BLD AUTO: 6.01 10*3/MM3 (ref 1.7–7)
NEUTROPHILS NFR BLD AUTO: 68.7 % (ref 42.7–76)
NRBC BLD AUTO-RTO: 0 /100 WBC (ref 0–0.2)
PHOSPHATE SERPL-MCNC: 2.7 MG/DL (ref 2.5–4.5)
PLATELET # BLD AUTO: 251 10*3/MM3 (ref 140–450)
PMV BLD AUTO: 10.1 FL (ref 6–12)
POTASSIUM SERPL-SCNC: 5.3 MMOL/L (ref 3.5–5.2)
RBC # BLD AUTO: 2.92 10*6/MM3 (ref 4.14–5.8)
SODIUM SERPL-SCNC: 128 MMOL/L (ref 136–145)
UNIT  ABO: NORMAL
UNIT  RH: NORMAL
WBC NRBC COR # BLD: 8.75 10*3/MM3 (ref 3.4–10.8)

## 2022-12-31 PROCEDURE — 85025 COMPLETE CBC W/AUTO DIFF WBC: CPT | Performed by: FAMILY MEDICINE

## 2022-12-31 PROCEDURE — 84100 ASSAY OF PHOSPHORUS: CPT | Performed by: FAMILY MEDICINE

## 2022-12-31 PROCEDURE — 80048 BASIC METABOLIC PNL TOTAL CA: CPT | Performed by: INTERNAL MEDICINE

## 2022-12-31 PROCEDURE — 99233 SBSQ HOSP IP/OBS HIGH 50: CPT | Performed by: INTERNAL MEDICINE

## 2022-12-31 PROCEDURE — 25010000002 CEFAZOLIN 1-4 GM/50ML-% SOLUTION: Performed by: INTERNAL MEDICINE

## 2022-12-31 PROCEDURE — 83735 ASSAY OF MAGNESIUM: CPT | Performed by: INTERNAL MEDICINE

## 2022-12-31 RX ADMIN — CEFAZOLIN SODIUM 1 G: 1 INJECTION, SOLUTION INTRAVENOUS at 20:35

## 2022-12-31 RX ADMIN — Medication 10 ML: at 20:35

## 2022-12-31 RX ADMIN — POLYETHYLENE GLYCOL 3350 17 G: 17 POWDER, FOR SOLUTION ORAL at 20:35

## 2022-12-31 RX ADMIN — HYDROCODONE BITARTRATE AND ACETAMINOPHEN 1 TABLET: 5; 325 TABLET ORAL at 11:19

## 2022-12-31 RX ADMIN — TRAMADOL HYDROCHLORIDE 50 MG: 50 TABLET, COATED ORAL at 19:53

## 2022-12-31 RX ADMIN — TRAMADOL HYDROCHLORIDE 50 MG: 50 TABLET, COATED ORAL at 06:38

## 2022-12-31 RX ADMIN — NIFEDIPINE 60 MG: 60 TABLET, EXTENDED RELEASE ORAL at 08:22

## 2022-12-31 RX ADMIN — LOSARTAN POTASSIUM 100 MG: 50 TABLET, FILM COATED ORAL at 20:35

## 2022-12-31 RX ADMIN — CALCITRIOL CAPSULES 0.25 MCG 0.25 MCG: 0.25 CAPSULE ORAL at 08:22

## 2022-12-31 RX ADMIN — ALUMINUM HYDROXIDE, MAGNESIUM HYDROXIDE, AND DIMETHICONE 15 ML: 400; 400; 40 SUSPENSION ORAL at 19:53

## 2022-12-31 RX ADMIN — ACETAMINOPHEN 650 MG: 325 TABLET ORAL at 13:20

## 2022-12-31 RX ADMIN — CARVEDILOL 12.5 MG: 12.5 TABLET, FILM COATED ORAL at 08:22

## 2022-12-31 RX ADMIN — CARVEDILOL 12.5 MG: 12.5 TABLET, FILM COATED ORAL at 20:35

## 2022-12-31 RX ADMIN — FERROUS SULFATE TAB 325 MG (65 MG ELEMENTAL FE) 325 MG: 325 (65 FE) TAB at 08:22

## 2022-12-31 RX ADMIN — Medication 10 ML: at 08:22

## 2022-12-31 NOTE — PLAN OF CARE
Problem: Adult Inpatient Plan of Care  Goal: Plan of Care Review  Outcome: Ongoing, Progressing  Flowsheets (Taken 12/30/2022 1830)  Progress: no change  Plan of Care Reviewed With: patient  Outcome Evaluation: Hgb 6.9 patient recieved 1 unit PRBC, tolerated well. Vital signs stable. temperature 99.1-99.3 today. See discharge care plans for further details.  Goal: Patient-Specific Goal (Individualized)  Outcome: Ongoing, Progressing  Goal: Absence of Hospital-Acquired Illness or Injury  Outcome: Ongoing, Progressing  Intervention: Identify and Manage Fall Risk  Recent Flowsheet Documentation  Taken 12/30/2022 1210 by Sara Blakely RN  Safety Promotion/Fall Prevention: safety round/check completed  Taken 12/30/2022 0815 by Sara Blakely RN  Safety Promotion/Fall Prevention: safety round/check completed  Taken 12/30/2022 0715 by Sara Blakely RN  Safety Promotion/Fall Prevention: safety round/check completed  Goal: Optimal Comfort and Wellbeing  Outcome: Ongoing, Progressing  Goal: Readiness for Transition of Care  Outcome: Ongoing, Progressing     Problem: Fall Injury Risk  Goal: Absence of Fall and Fall-Related Injury  Outcome: Ongoing, Progressing  Intervention: Promote Injury-Free Environment  Recent Flowsheet Documentation  Taken 12/30/2022 1210 by Sara Blakely RN  Safety Promotion/Fall Prevention: safety round/check completed  Taken 12/30/2022 0815 by Sara Blakely RN  Safety Promotion/Fall Prevention: safety round/check completed  Taken 12/30/2022 0715 by Sara Blakely RN  Safety Promotion/Fall Prevention: safety round/check completed     Problem: Infection  Goal: Absence of Infection Signs and Symptoms  Outcome: Ongoing, Progressing  Intervention: Prevent or Manage Infection  Recent Flowsheet Documentation  Taken 12/30/2022 0815 by Sara Blakely RN  Isolation Precautions: precautions discontinued   Goal Outcome Evaluation:  Plan of Care Reviewed With: patient        Progress: no change  Outcome  Evaluation: Hgb 6.9 patient recieved 1 unit PRBC, tolerated well. Vital signs stable. temperature 99.1-99.3 today. See discharge care plans for further details.

## 2022-12-31 NOTE — PLAN OF CARE
Goal Outcome Evaluation:  Plan of Care Reviewed With: patient           Outcome Evaluation: Pt has rested well this thift. medicated for pain with relief.

## 2022-12-31 NOTE — PROGRESS NOTES
LOS: 10 days   Patient Care Team:  Paulina Cullen MD as PCP - General (Family Medicine)    Chief Complaint:  Renal issues    Subjective     Interval History:     Patient Complaints: Chart reviewed.  No new issues overnight  Seen on dialysis  Sleeping, arousable, no complaints  Blood flow 450  UF at 4 Liters  No cramps, nausea, no SOA    !Patient Home Medications Stored in Pharmacy, , Does not apply, BID  calcitriol, 0.25 mcg, Oral, Every Other Day  carvedilol, 12.5 mg, Oral, BID  ceFAZolin, 1 g, Intravenous, Q24H  ferrous sulfate, 325 mg, Oral, Daily With Breakfast  heparin (porcine), 5,000 Units, Subcutaneous, Q8H  lidocaine, 1 patch, Transdermal, Q24H  lidocaine-prilocaine, 1 application, Topical, Once per day on Mon Wed Fri  losartan, 100 mg, Oral, Nightly  NIFEdipine XL, 60 mg, Oral, Daily  polyethylene glycol, 17 g, Oral, BID  sodium chloride, 10 mL, Intravenous, Q12H           Review of Systems:   General : No pain, no chills  HEENT: No headache, no nosebleed, no sore throat, no blurry vision  Chest : no chest pain, no palpitations  Respiratory: No cough, no SOA, no hemoptysis  GI:  No N/V/D, no abdominal pain  Skin : no rashes, no pruritus  Musculoskeletal : no flank pain, no joint effusions  Neuro : No weakness, no dizziness, no focal deficits  Endocrine: no heat/cold intolerance  Genitourinary: no dysuria or hematuria    Objective     Vital Signs  Temp:  [97.6 °F (36.4 °C)-99.1 °F (37.3 °C)] 99.1 °F (37.3 °C)  Heart Rate:  [] 105  Resp:  [16-20] 20  BP: (135-165)/(71-93) 135/78    Intake/Output Summary (Last 24 hours) at 12/31/2022 1508  Last data filed at 12/31/2022 0937  Gross per 24 hour   Intake 660 ml   Output --   Net 660 ml           Physical Exam:     General Appearance:     in no acute distress   Head:    Normocephalic, without obvious abnormality, atraumatic   Throat:    no thrush, oral mucosa moist   Neck:   No adenopathy, supple, no JVD   Musc:     No CVA tenderness to palpation    Lungs:     respirations unlabored, few  Rhonchi B    Heart:    Regular rate and rhythm now  , normal S1 and S2, no            murmur, no gallop, no rub   Abdomen:     Normal bowel sounds, no masses, soft non-tender   Extremities:    No lower extremity edema, no cyanosis   :     No hematuria    Skin:   Dry, No  bruising or rash                  Results Review:    Results from last 7 days   Lab Units 12/31/22  0631 12/30/22  0617 12/29/22  0953   SODIUM mmol/L 128* 129* 131*   POTASSIUM mmol/L 5.3* 5.3* 4.9   CHLORIDE mmol/L 88* 91* 88*   CO2 mmol/L 26.5 28.0 28.7   BUN mg/dL 68* 47* 82*   CREATININE mg/dL 9.15* 7.00* 9.75*   GLUCOSE mg/dL 109* 93 110*   CALCIUM mg/dL 7.7* 8.2* 7.7*     Results from last 7 days   Lab Units 12/31/22  0631 12/30/22  0617 12/29/22  0953   WBC 10*3/mm3 8.75 9.48 9.00   HEMOGLOBIN g/dL 8.1* 6.9* 7.0*   HEMATOCRIT % 26.0* 22.7* 23.1*   PLATELETS 10*3/mm3 251 280 278     Magnesium   Date Value Ref Range Status   12/31/2022 2.4 1.6 - 2.6 mg/dL Final   12/30/2022 2.2 1.6 - 2.6 mg/dL Final   12/29/2022 2.7 (H) 1.6 - 2.6 mg/dL Final     Phosphorus   Date Value Ref Range Status   12/31/2022 2.7 2.5 - 4.5 mg/dL Final   12/30/2022 2.2 (L) 2.5 - 4.5 mg/dL Final   12/29/2022 2.6 2.5 - 4.5 mg/dL Final     No results found for: BNP  Lab Results   Component Value Date    ALBUMIN 3.4 (L) 12/30/2022      Brief Urine Lab Results     None           No radiology results from the last 24 hrs         Assessment & Plan       Sepsis, due to unspecified organism, unspecified whether acute organ dysfunction present (HCC)    Pericardial effusion    ESRD (end stage renal disease) (HCC)    - End-stage renal disease, dialysis TTS schedule through right upper extremity AV graft.  Continue same schedule.  Seen on HD today  - History of SVC syndrome.  - COVID pneumonia.  --MSSA bacteremia, on antibiotics per primary.  Had previously had vascular stents.  AV graft seems to be working well.  He notes some drainage from  AVG, concern that may be graft infection.  Repeat blood cx from dialysis no growth.  Vascular following.  Evaluating for source now.  - Hypertension, with hypotension.  Monitor for now on meds  - Hyponatremia with hypervolemia, continue 2000 cc p.o. fluid restriction per day and try to correct with dialysis.  - Metabolic acidosis, mild.  Improved after dialysis.  - History of  shunt.  - Large pericardial effusion, evaluated by cardiology.  No tamponade.  45 to 50% EF.  Severe LVH with diastolic dysfunction.  - Hyperphosphatemia, then hypophosphatemia.  Replaced IV yesterday, had dialysis, still low this morning.  Getting additional replacement of phosphorus.  Not on low phosphorus diet.  Continue calcitriol 0.25 mcg on dialysis days per home dosing.  better  --Anemia-  S/P transfusion         Plan:    HD TTS      Please call if any questions  [phone]

## 2022-12-31 NOTE — NURSING NOTE
"The below HD orders have been completed. Blood was returned and Hemostasis achieved. For a more detailed report regarding treatment, please [facility] post treatment. Total Net fluid removal of 3500 ml, and BVP of 89 L. [facility] and Nephrology MD updated. Pt stable at the time of this note.    Pre Vitals:   Post Vitals:  BP: 138/77   BP: 143/67  HR: 94    HR: 88  RR: 18    RR: 18  Temp: 98.4   Temp: 97.4  Wt: 56.6 kg   Wt: 53.2 kg    Duration of Treatment: 3.5 Hours  Access Site: AVG  Dialyzer: F180  DFR: 700 mL/min  Dialysate Temperature (C): 36  BFR-As tolerated to a maximum of: 450 mL/min  Prime Dialyzer With NS to Priming Volume? Yes  Dialysate Solution Bath: K+ = 2 mEq, Ca = 2.5mEq  Bicarb: 35 mEq  Na+: 137 meq  Fluid Removal: wt gain+ 1kg      Clint \"Jason\" Robbin RN  [facility]  Office [phone]  Fax [phone]  "

## 2022-12-31 NOTE — DISCHARGE PLACEMENT REQUEST
"Ghassan Bronwing (32 y.o. Male)     Date of Birth   1990    Social Security Number       Address   [address] [place] [id number]    Home Phone   [phone]    MRN   [id number]       Sabianism   None    Marital Status   Single                            Admission Date   12/21/22    Admission Type   Emergency    Admitting Provider   Chema Kelsey MD    Attending Provider   Ramón Murray MD    Department, Room/Bed   [address], 3026/1       Discharge Date       Discharge Disposition       Discharge Destination                               Attending Provider: Ramón Murray MD    Allergies: Ace Inhibitors, Methylene Blue, Penicillins, Latex    Isolation: None   Infection: COVID (History) (12/30/22)   Code Status: CPR    Ht: 162.6 cm (64\")   Wt: 50.8 kg (111 lb 15.9 oz)    Admission Cmt: None   Principal Problem: Sepsis, due to unspecified organism, unspecified whether acute organ dysfunction present (HCC) [A41.9]                 Active Insurance as of 12/21/2022     Primary Coverage     Payor Plan Insurance Group Employer/Plan Group    ANTHEM MEDICARE REPLACEMENT ANTHEM MEDICARE ADVANTAGE [id number]     Payor Plan Address Payor Plan Phone Number Payor Plan Fax Number Effective Dates    [address] [phone]  6/1/2022 - None Entered    [facility] [address]       Subscriber Name Subscriber Birth Date Member ID       GHASSAN BROWNING 1990 [id number]           Secondary Coverage     Payor Plan Insurance Group Employer/Plan Group    [place] [id number]     Payor Plan Address Payor Plan Phone Number Payor Plan Fax Number Effective Dates    [employer] [address] [address] [phone]  5/1/2022 - None Entered    [facility] [id number]       Subscriber Name Subscriber Birth Date Member ID       GHASSAN BROWNING 1990 [id number]                 Emergency Contacts      (Rel.) Home Phone Work Phone Mobile Phone    " VIRGILIO BROWNING (Mother) [phone] -- [phone]    Kobi Varela (Friend) -- -- [phone]            Insurance Information                ANTHEM MEDICARE REPLACEMENT/ANTHEM MEDICARE ADVANTAGE Phone: [phone]    Subscriber: Ghassan Browning Subscriber#: [id number]    Group#: [id number] Precert#: --        [place] Phone: [phone]    Subscriber: Ghassan Browning Subscriber#: [id number]    Group#: [id number] Precert#: --             Consult Notes (most recent note)      Royce Sofia MD at 22 1549      Consult Orders    1. Inpatient Vascular Surgery Consult [[id number]] ordered by Chema Kelsey MD at 22 1150                [facility]   VASCULAR SURGERY CONSULT    Patient Name: Ghassan Browning  : 1990  MRN: [id number]  Primary Care Physician:  Paulina Cullen MD  Date of admission: 2022    Subjective   Subjective     Chief Complaint: Bacteremia with concern for fistula infection.    HPI:    Ghassan Browning is a 32 y.o. male admitted because of COVID-pneumonia and bacteremia.  The source for this is unclear.  I reviewed his chart but did not see the patient because of his current active COVID-pneumonia.  He has a right upper extremity brachiocephalic fistula with prior interventions.  He also has a covered stent graft in his brachiocephalic/subclavian vein on the right side that was placed about 5 years ago for central venous stenosis.  There is concern for the infection source to be related to his dialysis access.     Review of Systems   Unable to obtain due to patient's current condition  Personal History     Past Medical History:   Diagnosis Date   • Bronchitis    • Chronic pain    • Contusion, hip    • ESRD on dialysis (HCC)    • Headache    • History of brain shunt    • Hypertension    • Kidney disease    • Sleep apnea    • Wrist sprain        Past Surgical History:   Procedure Laterality Date   • CSF SHUNT Right        Family History: family  history is not on file. Otherwise pertinent FHx was reviewed and not pertinent to current issue.    Social History:  reports that he quit smoking about 2 weeks ago. His smoking use included cigarettes. He smoked an average of .5 packs per day. He does not have any smokeless tobacco history on file. He reports that he does not drink alcohol and does not use drugs.    Home Medications:  NIFEdipine XL, Marielena-Ismael, calcium acetate, calcium carbonate, carvedilol, ergocalciferol, and losartan    Allergies:  Allergies   Allergen Reactions   • Methylene Blue Anaphylaxis and Hives     Throat closes/ swelling    • Ace Inhibitors    • Latex    • Penicillins        Objective   Objective     Vitals:   Temp:  [97.6 °F (36.4 °C)-101.1 °F (38.4 °C)] 97.6 °F (36.4 °C)  Heart Rate:  [] 92  Resp:  [18-20] 18  BP: (115-135)/(56-72) 135/56    Physical Exam   Deferred due to current COVID-pneumonia status.  I reviewed the image of his right arm AV fistula that is in the chart.  There is some induration that is obvious.  No evidence of any bleeding or draining sinus.  There is some concern of purulent drainage at some point but not as obvious on the image.    Diagnostic studies: Pending    Labs:      Results from last 7 days   Lab Units 12/26/22  0803 12/25/22  0546 12/24/22  0621   WBC 10*3/mm3 11.17* 11.87* 14.25*   HEMOGLOBIN g/dL 9.0* 8.6* 9.5*   HEMATOCRIT % 28.7* 27.1* 29.8*   PLATELETS 10*3/mm3 213 148 124*         Results from last 7 days   Lab Units 12/26/22  0803 12/25/22  0546 12/24/22  0620   CREATININE mg/dL 8.71* 6.60* 9.46*                  No results found for: LDL      Results from last 7 days   Lab Units 12/26/22  0803   CRP mg/dL 17.50*        Assessment & Plan   Assessment / Plan     Active Hospital Problems:  Active Hospital Problems    Diagnosis    • **Sepsis, due to unspecified organism, unspecified whether acute organ dysfunction present ([facility])    • Pericardial effusion    • ESRD (end stage renal disease) ([facility])         Assessment/plan:   32-year-old male with concern for dialysis access infection.  He does have a covered stent graft in his brachiocephalic vein also.  We will get a CT with arterial and venous phase and also an ultrasound to see if there is any fluid collection or abscess around the fistula or any stranding in the perivenous area.  We will decide further steps based on that.        Electronically signed by Royce Sofia MD, 12/26/22, 3:45 PM EST.    Electronically signed by Royce Sofia MD at 12/26/22 4371

## 2022-12-31 NOTE — PROGRESS NOTES
[facility]   Hospitalist Progress Note  Date: 2022  Patient Name: Ghassan Browning  : 1990  MRN: [id number]  Date of admission: 2022      Subjective   Subjective     Chief Complaint: Follow up for fever and dizziness    Summary: 32 y.o. with ESRD on  hemodialysis, history of SVC syndrome with prior stenting, hydrocephalus with prior  shunt, presented with 4 days of worsening fevers and chills, found to have MSSA bacteremia, large pericardial effusion, COVID-19 infection on .  Having persistent fevers, concern for possible infected AV fistula, vascular surgery assisting with evaluation.    Interval Followup: No acute events overnight, cultures remain negative, last fever 101.1 on 2022 at 0 650    Review of Systems  No nausea no vomiting  No chest pain palpitations  No fever no chills    Objective   Objective     Vitals:   Temp:  [97.6 °F (36.4 °C)-99.1 °F (37.3 °C)] 99.1 °F (37.3 °C)  Heart Rate:  [] 105  Resp:  [16-20] 20  BP: (135-165)/(71-93) 135/78    Physical Exam   General appearance: NAD,   Eyes: anicteric sclerae, moist conjunctivae; no lid-lag; PERRLA  HENT: Atraumatic; oropharynx clear with moist mucous membranes and no mucosal ulcerations; normal hard and soft palate, facial swelling noted  Neck: Trachea midline; FROM, supple, no thyromegaly or lymphadenopathy  Lungs: CTA, with normal respiratory effort and no intercostal retractions  CV: Regular Rate and Rhythm, no Murmurs, Rubs, or Gallops   Abdomen: Soft, non-tender; no masses or Heptosplenomegally  Extremities: No peripheral edema or extremity lymphadenopathy  Skin: Normal temperature, turgor and texture; no rash, ulcers or subcutaneous nodules  Psych: Appropriate affect, alert and oriented to person, place and time  Neuro: CN II - XII intact no motor deficits, no sensory defecits, globally weak         Result Review    Result Review:  I have personally reviewed the following over the  last 24 hours (07:00 to 07:00) and agree with the following findings  [x]  Laboratory   CBC    CBC 12/29/22 12/30/22 12/31/22   WBC 9.00 9.48 8.75   RBC 2.50 (A) 2.45 (A) 2.92 (A)   Hemoglobin 7.0 (A) 6.9 (A) 8.1 (A)   Hematocrit 23.1 (A) 22.7 (A) 26.0 (A)   MCV 92.4 92.7 89.0   MCH 28.0 28.2 27.7   MCHC 30.3 (A) 30.4 (A) 31.2 (A)   RDW 20.4 (A) 20.2 (A) 21.3 (A)   Platelets 278 280 251   (A) Abnormal value            BMP    BMP 12/29/22 12/30/22 12/31/22   BUN 82 (A) 47 (A) 68 (A)   Creatinine 9.75 (A) 7.00 (A) 9.15 (A)   Sodium 131 (A) 129 (A) 128 (A)   Potassium 4.9 5.3 (A) 5.3 (A)   Chloride 88 (A) 91 (A) 88 (A)   CO2 28.7 28.0 26.5   Calcium 7.7 (A) 8.2 (A) 7.7 (A)   (A) Abnormal value            [x]  Microbiology  Repeat peripheral blood culture remain negative  []  Radiology  []  EKG/Telemetry   [x]  Cardiology/Vascular TTE EF 45 to 50%, grade 2 diastolic dysfunction, elevated right ventricular systolic pressure, large circumferential pericardial effusion without evidence of tamponade  []  Pathology  [x]  Old records previous progress note  []  Other:    Assessment & Plan   Assessment / Plan     Assessment:  · MSSA bacteremia  · Sepsis secondary to MSSA bacteremia, suspect secondary to left lower lobe pneumonia   · COVID-19 pneumonia  · Diastolic CHF with borderline reduced EF 46 to 50%, grade 2 diastolic dysfunction  · Large, greater than 2 cm circumferential pericardial effusion without tamponade  · Hydrocephalus with  shunt  · History of SVC syndrome with prior stenting approximately 2016  · ESRD on hemodialysis  · Chronic normocytic anemia  · Hypertension -controlled  · Iron deficiency anemia  · Severe protein calorie malnutrition  · Lactic acidosis, clinically significant  · BRIAN on previously on NIPPV, not recently given unit recalled   · Difficult social status: Homeless  · GERD  · Severe hypophosphatemia    Plan:    Spiked a fever this morning.  No leukocytosis.  Not endorsing any new infectious  symptoms. Repeat blood culture remain negative to date.  Continue IV cefazolin 1 g q24 hours. Monitor fever curve  Continue to monitor hemoglobin 8.1 today.  No signs of bleeding.  Patient status post 1 PRBC 12/30/2020  Continue oral iron replacement  Vascular surgery following, appreciate recommended  Continue HD; per nephrology  Continue lidocaine patch.  Continue as needed p.o. Norco regimen  Continue Coreg, losartan, nifedipine XL  Social work arranging IV antibiotics with dialysis     Reviewed patients labs and imaging, and discussed with patient and nurse at bedside.    DVT prophylaxis: Heparin 5000 units every 5 hours  Medical and mechanical DVT prophylaxis orders are present.    CODE STATUS:   Code Status (Patient has no pulse and is not breathing): CPR (Attempt to Resuscitate)  Medical Interventions (Patient has pulse or is breathing): Full Support      Electronically signed by Ramón Murray MD, 12/31/22, 1:37 PM EST.

## 2023-01-01 ENCOUNTER — READMISSION MANAGEMENT (OUTPATIENT)
Dept: CALL CENTER | Facility: HOSPITAL | Age: 33
End: 2023-01-01
Payer: MEDICARE

## 2023-01-01 VITALS
SYSTOLIC BLOOD PRESSURE: 125 MMHG | TEMPERATURE: 98.5 F | RESPIRATION RATE: 18 BRPM | OXYGEN SATURATION: 97 % | BODY MASS INDEX: 19.12 KG/M2 | HEIGHT: 64 IN | HEART RATE: 95 BPM | WEIGHT: 111.99 LBS | DIASTOLIC BLOOD PRESSURE: 75 MMHG

## 2023-01-01 LAB
BACTERIA SPEC AEROBE CULT: NORMAL
BASOPHILS # BLD AUTO: 0.06 10*3/MM3 (ref 0–0.2)
BASOPHILS NFR BLD AUTO: 0.7 % (ref 0–1.5)
DEPRECATED RDW RBC AUTO: 68.5 FL (ref 37–54)
EOSINOPHIL # BLD AUTO: 0.33 10*3/MM3 (ref 0–0.4)
EOSINOPHIL NFR BLD AUTO: 4 % (ref 0.3–6.2)
ERYTHROCYTE [DISTWIDTH] IN BLOOD BY AUTOMATED COUNT: 20.7 % (ref 12.3–15.4)
HCT VFR BLD AUTO: 30.3 % (ref 37.5–51)
HGB BLD-MCNC: 9.4 G/DL (ref 13–17.7)
IMM GRANULOCYTES # BLD AUTO: 0.03 10*3/MM3 (ref 0–0.05)
IMM GRANULOCYTES NFR BLD AUTO: 0.4 % (ref 0–0.5)
LYMPHOCYTES # BLD AUTO: 0.98 10*3/MM3 (ref 0.7–3.1)
LYMPHOCYTES NFR BLD AUTO: 11.8 % (ref 19.6–45.3)
MAGNESIUM SERPL-MCNC: 2.5 MG/DL (ref 1.6–2.6)
MCH RBC QN AUTO: 27.9 PG (ref 26.6–33)
MCHC RBC AUTO-ENTMCNC: 31 G/DL (ref 31.5–35.7)
MCV RBC AUTO: 89.9 FL (ref 79–97)
MONOCYTES # BLD AUTO: 1.25 10*3/MM3 (ref 0.1–0.9)
MONOCYTES NFR BLD AUTO: 15.1 % (ref 5–12)
NEUTROPHILS NFR BLD AUTO: 5.63 10*3/MM3 (ref 1.7–7)
NEUTROPHILS NFR BLD AUTO: 68 % (ref 42.7–76)
NRBC BLD AUTO-RTO: 0 /100 WBC (ref 0–0.2)
PHOSPHATE SERPL-MCNC: 3.2 MG/DL (ref 2.5–4.5)
PLATELET # BLD AUTO: 277 10*3/MM3 (ref 140–450)
PMV BLD AUTO: 9.5 FL (ref 6–12)
RBC # BLD AUTO: 3.37 10*6/MM3 (ref 4.14–5.8)
WBC NRBC COR # BLD: 8.28 10*3/MM3 (ref 3.4–10.8)

## 2023-01-01 PROCEDURE — 83735 ASSAY OF MAGNESIUM: CPT | Performed by: INTERNAL MEDICINE

## 2023-01-01 PROCEDURE — 84100 ASSAY OF PHOSPHORUS: CPT | Performed by: FAMILY MEDICINE

## 2023-01-01 PROCEDURE — 85025 COMPLETE CBC W/AUTO DIFF WBC: CPT | Performed by: FAMILY MEDICINE

## 2023-01-01 PROCEDURE — 25010000002 VANCOMYCIN 5 G RECONSTITUTED SOLUTION: Performed by: INTERNAL MEDICINE

## 2023-01-01 PROCEDURE — 99239 HOSP IP/OBS DSCHRG MGMT >30: CPT | Performed by: INTERNAL MEDICINE

## 2023-01-01 RX ORDER — CALCITRIOL 0.25 UG/1
0.25 CAPSULE, LIQUID FILLED ORAL EVERY OTHER DAY
Qty: 15 CAPSULE | Refills: 0 | Status: SHIPPED | OUTPATIENT
Start: 2023-01-02 | End: 2023-02-01

## 2023-01-01 RX ORDER — FERROUS SULFATE 325(65) MG
325 TABLET ORAL
Qty: 30 TABLET | Refills: 0 | Status: SHIPPED | OUTPATIENT
Start: 2023-01-02 | End: 2023-02-01

## 2023-01-01 RX ADMIN — HYDROCODONE BITARTRATE AND ACETAMINOPHEN 1 TABLET: 5; 325 TABLET ORAL at 04:22

## 2023-01-01 RX ADMIN — VANCOMYCIN HYDROCHLORIDE 1000 MG: 5 INJECTION, POWDER, LYOPHILIZED, FOR SOLUTION INTRAVENOUS at 12:05

## 2023-01-01 RX ADMIN — CARVEDILOL 12.5 MG: 12.5 TABLET, FILM COATED ORAL at 08:01

## 2023-01-01 RX ADMIN — FERROUS SULFATE TAB 325 MG (65 MG ELEMENTAL FE) 325 MG: 325 (65 FE) TAB at 08:01

## 2023-01-01 RX ADMIN — NIFEDIPINE 60 MG: 60 TABLET, EXTENDED RELEASE ORAL at 08:01

## 2023-01-01 RX ADMIN — Medication 10 ML: at 07:58

## 2023-01-01 RX ADMIN — TRAMADOL HYDROCHLORIDE 50 MG: 50 TABLET, COATED ORAL at 15:20

## 2023-01-01 NOTE — PLAN OF CARE
Goal Outcome Evaluation:              Outcome Evaluation: Pt hypertensive at times. Dialysis performed today. Pt was hoping to be discharged after dialysis tonight but IV antibiotics were not able to be set up yet for dc. Pt agreed to stay till tomorrow morning. Will continue to assess.

## 2023-01-01 NOTE — PLAN OF CARE
Goal Outcome Evaluation:  Plan of Care Reviewed With: patient        Progress: no change    NO ACUTE EVENTS, PATIENT HOPING TO DC HOME TODAY

## 2023-01-01 NOTE — DISCHARGE SUMMARY
[facility]         HOSPITALIST  DISCHARGE SUMMARY    Patient Name: Ghassan Browning  : 1990  MRN: [id number]    Date of Admission: 2022  Date of Discharge:  2023  Primary Care Physician: Paulina Cullen MD    Consults     Date and Time Order Name Status Description    2022 11:50 AM Inpatient Vascular Surgery Consult Completed     2022  6:40 AM Inpatient Cardiology Consult      2022  3:25 AM Inpatient Nephrology Consult Completed     2022  2:09 AM Hospitalist (on-call MD unless specified)            Active and Resolved Hospital Problems:  • MSSA bacteremia  • Sepsis secondary to MSSA bacteremia, suspect secondary to left lower lobe pneumonia   • COVID-19 pneumonia  • Diastolic CHF with borderline reduced EF 46 to 50%, grade 2 diastolic dysfunction  • Large, greater than 2 cm circumferential pericardial effusion without tamponade  • Hydrocephalus with  shunt  • History of SVC syndrome with prior stenting approximately   • ESRD on hemodialysis  • Chronic normocytic anemia  • Hypertension -controlled  • Iron deficiency anemia  • Severe protein calorie malnutrition  • Lactic acidosis, clinically significant  • BRIAN on previously on NIPPV, not recently given unit recalled   • Difficult social status: Homeless  • GERD  • Severe hypophosphatemia    Hospital Course     Hospital Course:  32 y.o. with ESRD on  hemodialysis, history of SVC syndrome with prior stenting, hydrocephalus with prior  shunt, presented with 4 days of worsening fevers and chills, found to have MSSA bacteremia, large pericardial effusion, COVID-19 infection on .  Having persistent fevers, concern for possible infected AV fistula, vascular surgery assisting with evaluation.  Patient was treated with IV antibiotics, cefazolin with improvement of his symptoms, his cultures did clear, at this time he will continue on IV antibiotics for an additional 2 weeks  following discharge from the hospital.  Patient should follow-up with vascular surgery, the decision was made to not remove patient's graft as this would greatly limit his dialysis access.  To improve patient compliance antibiotics were changed to vancomycin at discharge, he will receive dosing with dialysis, I contacted his nephrologist who will set up this dosing.  He is discharged home today to follow-up with nephrology, his PCP, vascular surgery.  He is discharged in stable condition    Day of Discharge     Vital Signs:  Temp:  [97.7 °F (36.5 °C)-99.9 °F (37.7 °C)] 98.3 °F (36.8 °C)  Heart Rate:  [] 97  Resp:  [18-20] 18  BP: (126-152)/(60-93) 133/60    Physical Exam:   General appearance: NAD,   Eyes: anicteric sclerae, moist conjunctivae; no lid-lag; PERRLA  HENT: Atraumatic; oropharynx clear with moist mucous membranes and no mucosal ulcerations; normal hard and soft palate, facial swelling noted  Neck: Trachea midline; FROM, supple, no thyromegaly or lymphadenopathy  Lungs: CTA, with normal respiratory effort and no intercostal retractions  CV: Regular Rate and Rhythm, no Murmurs, Rubs, or Gallops   Abdomen: Soft, non-tender; no masses or Heptosplenomegally  Extremities: No peripheral edema or extremity lymphadenopathy  Skin: Normal temperature, turgor and texture; no rash, ulcers or subcutaneous nodules  Psych: Appropriate affect, alert and oriented to person, place and time  Neuro: CN II - XII intact no motor deficits, no sensory defecits, globally weak    Discharge Details        Discharge Medications      New Medications      Instructions Start Date   calcitriol 0.25 MCG capsule  Commonly known as: ROCALTROL   0.25 mcg, Oral, Every Other Day   Start Date: January 2, 2023     ferrous sulfate 325 (65 FE) MG tablet   325 mg, Oral, Daily With Breakfast   Start Date: January 2, 2023     vancomycin   1,000 mg, Intravenous, Once         Continue These Medications      Instructions Start Date   carvedilol  12.5 MG tablet  Commonly known as: COREG   1 tablet, Oral, 2 Times Daily      losartan 100 MG tablet  Commonly known as: COZAAR   100 mg, Oral, Nightly      NIFEdipine XL 60 MG 24 hr tablet  Commonly known as: PROCARDIA XL   60 mg, Oral, Daily      Marielena-Ismael tablet   1 tablet, Oral, Daily         Stop These Medications    calcium acetate 667 MG capsule capsule  Commonly known as: PHOS BINDER)     calcium carbonate 1250 (500 Ca) MG chewable tablet  Commonly known as: OS-JENNIFER     ergocalciferol 1.25 MG (79461 UT) capsule  Commonly known as: ERGOCALCIFEROL            Allergies   Allergen Reactions   • Ace Inhibitors Shortness Of Breath and Swelling     Used to take lisinopril (2011). Throat swelling, difficulty breathing      • Methylene Blue Anaphylaxis and Hives     Throat closes/ swelling    • Penicillins Rash     Tolerated Cefazolin and Cefepime in 12/2022 -[facility]   • Latex        Discharge Disposition:  Home or Self Care    Diet:  Hospital:  Diet Order   Procedures   • Diet: Renal Diets, Fluid Restriction (240 mL/tray) Diets; Low Potassium; 2000 mL/day; Texture: Regular Texture (IDDSI 7); Fluid Consistency: Thin (IDDSI 0)       Discharge Activity:       CODE STATUS:  Code Status and Medical Interventions:   Ordered at: 12/21/22 0322     Code Status (Patient has no pulse and is not breathing):    CPR (Attempt to Resuscitate)     Medical Interventions (Patient has pulse or is breathing):    Full Support       No future appointments.    Additional Instructions for the Follow-ups that You Need to Schedule     Discharge Follow-up with PCP   As directed       Currently Documented PCP:    Paulina Cullen MD    PCP Phone Number:    [phone]     Follow Up Details: 3 to 7 days         Discharge Follow-up with Specified Provider: nephrology; 2 Weeks   As directed      To: nephrology    Follow Up: 2 Weeks         Discharge Follow-up with Specified Provider: vascular; 2 Weeks   As directed      To: vascular     Follow Up: 2 Weeks               Pertinent  and/or Most Recent Results     IMAGING:  Adult Transthoracic Echo Complete W/ Cont if Necessary Per Protocol    Result Date: 12/21/2022  •  Left ventricular ejection fraction appears to be 46 - 50%. •  The left ventricular cavity is mildly dilated. •  Left ventricular wall thickness is consistent with moderate to severe concentric hypertrophy. •  Left ventricular diastolic function is consistent with (grade II w/high LAP) pseudonormalization. •  Moderately reduced right ventricular systolic function noted. •  The right ventricular cavity is mildly dilated. •  Left atrial volume is moderately increased. •  Moderate tricuspid valve regurgitation is present. •  Estimated right ventricular systolic pressure from tricuspid regurgitation is moderately elevated (45-55 mmHg). •  There is a large (>2cm) circumferential pericardial effusion. •  There is no evidence of cardiac tamponade.     XR Chest 2 View    Result Date: 12/21/2022  PROCEDURE: XR CHEST 2 VW  COMPARISON: None.  INDICATIONS: 32-year-old male with fever, cough, shortness of breath.  FINDINGS:  Two views were obtained.  Moderate to marked cardiomegaly is seen.  An endovascular stent is projected over the right paratracheal region.  There is a partially visualized ventriculoperitoneal () shunt (distal limb) coursing over the right base of neck, right thorax, and right upper quadrant of the abdomen.  Surgical clips are projected over the base of the neck and the superior mediastinum.  There is suspected scoliosis.  There are bilateral infiltrates.  Mild pulmonary edema is possible.  Infectious multifocal pneumonia would be in the differential diagnosis.  Minimal, if any, pleural effusion is seen.  No pneumothorax.  No pneumomediastinum.  There is slight asymmetry of the soft tissues at the level of the base of the neck with those on the right more prominent than seen contralaterally.  The finding may be related to  the patient's scoliosis.  Please correlate clinically.        Bilateral infiltrates are seen.  The findings may represent mild pulmonary edema with vascular congestion.  Pneumonia cannot be excluded.  There is moderate to marked cardiomegaly.  Please see above comments for further detail.     Please note that portions of this note were completed with a voice recognition program.  JOHNATHON ALVAREZ JR, MD       Electronically Signed and Approved By: JOHNATHON ALVAREZ JR, MD on 12/21/2022 at 1:59              CT Soft Tissue Neck Without Contrast    Result Date: 12/23/2022  PROCEDURE: CT SOFT TISSUE NECK WO CONTRAST  COMPARISON: [facility], CT, CT CHEST WO CONTRAST DIAGNOSTIC, 12/21/2022, 4:16.  INDICATIONS: Soft tissue swelling below jaw, concern for possible abscess  PROTOCOL:   Standard imaging protocol performed    RADIATION:   DLP: 189 mGy*cm   Automated exposure control was utilized to minimize radiation dose.  TECHNIQUE: After obtaining the patient's consent, CT images were created without non-ionic intravenous contrast material.   FINDINGS:  Upper slices through the head reveal a ventricular shunt that looks like the tip is around the area of the 3rd ventricle.  The lack of contrast limits assessment for an underlying infection.  There are some nonspecific lymph nodes within the neck.  The pharyngeal and laryngeal areas are grossly unremarkable.  The thyroid is not enlarged.  Lower slices through the base the neck reveal what looks like some scattered lymphadenopathy.  There also some suggested lymphadenopathy in the visualized upper mediastinum.  There is a reversal of the normal lordosis.  There is a right subclavian vascular venous stent graft.  There are degenerative changes involving the TMJ joints bilaterally.  There is some mucosal thickening along the floor of the maxillary sinuses.    A marker was placed in the submental area where patient has a wound.  There appears to be some minimal  fluid/soft tissue changes underlying this area that could relate to some focal inflammation in this area.        1. There is nonspecific lymphadenopathy within the neck and in visualized upper mediastinum.  This could be pathologic or a manifestation of reactive change.  Correlation with patient's history recommended. 2.  shunt tube is noted. 3. Vascular venous stent graft right subclavian area noted. 4. There is some minimal soft tissue changes in the submental area where patient has a wound that could reflect sequela to inflammatory infectious process in this area.     ELTON GIORDANO MD       Electronically Signed and Approved By: ELTON GIORDANO MD on 12/23/2022 at 14:26             CT Chest Without Contrast Diagnostic    Result Date: 12/21/2022  PROCEDURE: CT CHEST WO CONTRAST DIAGNOSTIC  COMPARISON: None.  INDICATIONS: 32-year-old male w/ pneumonia, complication suspected; chest x-ray done; unspecified congestion; shortness of breath.  TECHNIQUE: 602 CT images were created without the administration of contrast material.   PROTOCOL:   Standard CT imaging protocol performed.    RADIATION:   Total DLP: 215.3 mGy*cm.   Automated exposure control was utilized to minimize radiation dose.  FINDINGS: Mild diffuse crazy-paving pattern of pulmonary opacification is seen, especially in the lung bases.  Diffuse centrilobular pulmonary opacities are also seen.  Differential considerations for the findings are many, such as with ARDS, infectious multifocal pneumonia, inflammatory pneumonitis, and/or pulmonary edema.  COVID-19 pneumonia may give this appearance.  There is moderate-to-marked cardiomegaly.  There is a moderate-to-large pericardial effusion, which measures about 2.7 cm in thickness.  The CT number for the pericardial fluid is 24 Hounsfield units or slightly less.  There may be mild subsegmental atelectasis in the lung bases.  There are enlarged mediastinal and hilar lymph nodes, which are nonspecific.  There are  also enlarged lymph nodes involving the base of the neck, supraclavicular regions, axillary regions, partially imaged upper abdomen.  Please correlate clinically, especially with history of malignancy, such as lymphoma.  Endovascular stents are seen in the expected superior vena cava (SVC) and right brachiocephalic vein and probably in the central right subclavian vein.  Minimal if any pleural effusion is seen.  No pneumothorax or pneumomediastinum.  There may be minimal transient intravenous gas, likely iatrogenic in nature.  The partially imaged kidneys are atrophic with diffuse cortical scarring.  There is motion artifact on the exam.  There may be borderline hepatomegaly.  Probably no splenomegaly.  There is suspected dextroscoliosis of the thoracic spine.  No definite acute fracture or aggressive osseous lesion is appreciated.  There may be anasarca.        1. Bilateral infiltrates are seen, which are nonspecific.  Infectious multifocal pneumonia would be in the differential diagnosis.  2. There is moderate-to-marked cardiomegaly.  3. There is a moderate-to-large pericardial effusion.  4. Extensive adenopathy is seen, as discussed.  Please correlate clinically.  For instance, does the patient have a history of lymphoma or other malignancies?  5. No pneumothorax or pneumomediastinum is suggested.  6. Please see above comments for further detail.   1.   Please note that portions of this note were completed with a voice recognition program.  JOHNATHON ALVAREZ JR, MD       Electronically Signed and Approved By: JOHNATHON ALVAREZ JR, MD on 12/21/2022 at 4:54              CT Chest With Contrast Diagnostic    Result Date: 12/26/2022  PROCEDURE: CT CHEST W CONTRAST DIAGNOSTIC  COMPARISON:  [facility], CT, CT SOFT TISSUE NECK WO CONTRAST, 12/23/2022, 13:49.  [facility], CT, CT CHEST WO CONTRAST DIAGNOSTIC, 12/21/2022, 4:16. INDICATIONS: with arterial and delayed imaging as per vascular surgery -  evaluate for svc syndrome, pe, rue swelling.  hx of SVC syndrome with stent, chronic  shunt for hy  TECHNIQUE: After obtaining the patient's consent, CT images were obtained with non-ionic intravenous contrast material.   PROTOCOL:   Pulmonary embolism imaging protocol performed    RADIATION:   DLP: 202mGy*cm   Automated exposure control was utilized to minimize radiation dose. CONTRAST: 100cc Isovue 370 I.V.  FINDINGS:  There is a stent traversing the central aspect of the right subclavian vein extending into the upper half of the superior vena cava.  The stent appears widely patent.  The left brachiocephalic, azygos vein and inferior vena cava appear patent over the visualized segments.  The left subclavian vein is widely patent.  The right subclavian vein is enlarged with associated collaterals.  There are also venous collaterals at the right anterior chest wall.  There appears to be obstruction of the inferior aspect of the internal jugular vein on the right.  The left internal jugular vein appears patent over the visualized segments.  There is conventional 3 vessel arch anatomy.  The visualized aortic branch vessels, aortic arch, ascending and descending thoracic aorta appear within normal limits.  Visualized abdominal aorta is unremarkable.  The heart is moderately enlarged.  There is moderate pericardial effusion., which appears unchanged from the prior study.  There is stable multifocal mediastinal lymphadenopathy.  The thyroid, trachea and esophagus appear within normal limits.  There are no obvious coronary artery calcifications.  There is no evidence of pulmonary embolism.  There is patchy airspace disease within the left lung base concerning for pneumonia.  There is a focus of airspace disease within the left upper lobe also concerning for pneumonia.  There is diffuse ground-glass opacity in the lungs with diffuse central lobular nodules.  The appearance appears similar to, but progressive when  compared with 1221. There is no pneumothorax.  No pleural effusion.  Airways are patent although there is diffuse peribronchial thickening.  There is diffuse body wall edema.  There is right greater than left axillary adenopathy, which could be congestive.  Limited images of the upper abdomen demonstrate no acute findings.  Both kidneys appear markedly atrophic and likely nonfunctional.  Catheter tubing traverses the right anterior chest wall and upper abdomen.  There are no acute osseous abnormalities or destructive bone lesions.  There are no significant osseous degenerative changes.  There is diffuse sclerosis of the bones, which could be related to renal osteodystrophy.         1. There is a subclavian-superior vena cava stent in place that appears widely patent.  The remainder of the superior vena cava and right subclavian vein appear widely patent and the veins in the visualized portion of the right upper extremity are engorged. 2. There does appear to be chronic atrophic obstruction of the right internal jugular vein at the inferior aspect. 3. There is diffuse body wall edema and there is mild diffuse lymphadenopathy, which is most pronounced in the right axilla.  This appears potentially congestive. 4. There is significant pooling of contrast in the injected left arm and there is significant reflux of contrast into the hepatic veins.  There is cardiomegaly and moderate pericardial effusion.  The congestive changes could be related to a problem with cardiac output. 5. Foci of pneumonia in the left lung. 6. Worsening ground-glass opacities and centrilobular nodules in both lungs favored to reflect pulmonary edema. 7. Atrophic kidneys, likely nonfunctional and diffuse bony sclerosis, likely renal osteodystrophy.     BRANDO NOBLES MD       Electronically Signed and Approved By: BRANDO NOBLES MD on 12/26/2022 at 19:38             XR Abdomen KUB    Result Date: 12/28/2022  PROCEDURE: XR ABDOMEN KUB   COMPARISON: [facility], CT, CT CHEST W CONTRAST DIAGNOSTIC, 12/26/2022, 18:10.  INDICATIONS: abdominal pain, LLQ  FINDINGS:  Shunt tubing coursing through to the abdomen.  Postsurgical changes within the right lower quadrant.  Nondilated loops of large and small bowel are identified.  Supine technique limits evaluation for pneumoperitoneum.  Lung bases are clear.        1. Shunt tubing coursing through the abdomen and terminating in the left low pelvis. 2. No evidence of bowel obstruction.     VINCE THOMAS MD       Electronically Signed and Approved By: VINCE THOMAS MD on 12/28/2022 at 16:56             Duplex Hemodialysis Access CAR    Result Date: 12/27/2022  •  Patent right arm AV graft without significant stenosis being detected.       LAB RESULTS:      Lab 01/01/23  0625 12/31/22  0631 12/30/22  0617 12/29/22  0953 12/28/22  1558 12/28/22  0542 12/27/22  0557 12/26/22  0803   WBC 8.28 8.75 9.48 9.00  --  10.25   < > 11.17*   HEMOGLOBIN 9.4* 8.1* 6.9* 7.0*  --  7.4*   < > 9.0*   HEMATOCRIT 30.3* 26.0* 22.7* 23.1*  --  24.4*   < > 28.7*   PLATELETS 277 251 280 278  --  270   < > 213   NEUTROS ABS 5.63 6.01 7.06* 5.96  --  6.61   < > 8.38*   IMMATURE GRANS (ABS) 0.03 0.05 0.07* 0.08*  --  0.11*   < >  --    LYMPHS ABS 0.98 0.93 0.79 1.04  --  1.30   < >  --    MONOS ABS 1.25* 1.35* 1.15* 1.47*  --  1.83*   < >  --    EOS ABS 0.33 0.36 0.38 0.40  --  0.35   < > 0.11   MCV 89.9 89.0 92.7 92.4  --  93.1   < > 90.8   SED RATE  --   --   --   --   --   --   --  >130*   CRP  --   --   --   --   --   --   --  17.50*   PROCALCITONIN  --   --   --   --   --  54.22*  --   --    LACTATE  --   --   --   --  1.1  --   --   --     < > = values in this interval not displayed.         Lab 01/01/23  0625 12/31/22  0631 12/30/22  0617 12/29/22  0953 12/28/22  0542 12/27/22  0557   SODIUM  --  128* 129* 131* 131* 126*   POTASSIUM  --  5.3* 5.3* 4.9 4.7 4.6   CHLORIDE  --  88* 91* 88* 89* 85*   CO2  --  26.5 28.0 28.7  29.4* 24.6   ANION GAP  --  13.5 10.0 14.3 12.6 16.4*   BUN  --  68* 47* 82* 61* 85*   CREATININE  --  9.15* 7.00* 9.75* 7.67* 10.00*   EGFR  --  7.2* 9.9* 6.6* 8.8* 6.4*   GLUCOSE  --  109* 93 110* 85 146*   CALCIUM  --  7.7* 8.2* 7.7* 7.8* 6.8*   MAGNESIUM 2.5 2.4 2.2 2.7* 2.6 2.6   PHOSPHORUS 3.2 2.7 2.2* 2.6 2.2* 3.1         Lab 12/30/22  0617 12/29/22  0953 12/28/22  0542 12/27/22  0557 12/26/22  0803   TOTAL PROTEIN 8.6* 8.1 8.1 7.7 8.1   ALBUMIN 3.4* 3.4* 3.7 3.3* 3.60   GLOBULIN 5.2 4.7 4.4 4.4 4.5   ALT (SGPT) <5 <5 <5 <5 <5   AST (SGOT) 16 15 18 16 17   BILIRUBIN 0.3 0.3 0.4 0.3 0.3   ALK PHOS 87 88 99 96 134*                 Lab 12/30/22  0729 12/29/22  0953 12/28/22  0542 12/27/22  0557   IRON  --   --   --  29*   IRON SATURATION  --   --   --  13*   TIBC  --   --   --  225*   TRANSFERRIN  --   --   --  151*   FERRITIN  --   --   --  1,181.00*   FOLATE  --   --  10.10  --    VITAMIN B 12  --   --  604  --    ABO TYPING A   < >  --   --    RH TYPING Positive   < >  --   --    ANTIBODY SCREEN Negative  --   --   --     < > = values in this interval not displayed.         Brief Urine Lab Results     None        Microbiology Results (last 10 days)     Procedure Component Value - Date/Time    Blood Culture - Blood, Arm, Right [[id number]]  (Normal) Collected: 12/27/22 0852    Lab Status: Final result Specimen: Blood from Arm, Right Updated: 01/01/23 1146     Blood Culture No growth at 5 days    Blood Culture - Blood, Arm, Left [[id number]]  (Normal) Collected: 12/26/22 1026    Lab Status: Final result Specimen: Blood from Arm, Left Updated: 12/31/22 1031     Blood Culture No growth at 5 days    Blood Culture - Blood, Arm, Left [[id number]]  (Normal) Collected: 12/26/22 1026    Lab Status: Final result Specimen: Blood from Arm, Left Updated: 12/31/22 1031     Blood Culture No growth at 5 days    Blood Culture - Blood, Arm, Left [[id number]]  (Normal) Collected: 12/23/22 0843    Lab Status: Final result Specimen:  Blood from Arm, Left Updated: 12/28/22 0900     Blood Culture No growth at 5 days    Blood Culture - Blood, Arm, Left [[id number]]  (Normal) Collected: 12/23/22 0843    Lab Status: Final result Specimen: Blood from Arm, Left Updated: 12/28/22 0900     Blood Culture No growth at 5 days        Results for orders placed during the hospital encounter of 12/21/22    Duplex Hemodialysis Access CAR    Interpretation Summary  •  Patent right arm AV graft without significant stenosis being detected.    Results for orders placed during the hospital encounter of 12/21/22    Duplex Hemodialysis Access CAR    Interpretation Summary  •  Patent right arm AV graft without significant stenosis being detected.    Results for orders placed during the hospital encounter of 12/21/22    Adult Transthoracic Echo Complete W/ Cont if Necessary Per Protocol    Interpretation Summary  •  Left ventricular ejection fraction appears to be 46 - 50%.  •  The left ventricular cavity is mildly dilated.  •  Left ventricular wall thickness is consistent with moderate to severe concentric hypertrophy.  •  Left ventricular diastolic function is consistent with (grade II w/high LAP) pseudonormalization.  •  Moderately reduced right ventricular systolic function noted.  •  The right ventricular cavity is mildly dilated.  •  Left atrial volume is moderately increased.  •  Moderate tricuspid valve regurgitation is present.  •  Estimated right ventricular systolic pressure from tricuspid regurgitation is moderately elevated (45-55 mmHg).  •  There is a large (>2cm) circumferential pericardial effusion.  •  There is no evidence of cardiac tamponade.      Time spent on Discharge including face to face service: Greater than 30 minutes      Electronically signed by Ramón Murray MD, 01/01/23, 1:04 PM EST.

## 2023-01-01 NOTE — PLAN OF CARE
Goal Outcome Evaluation:              Outcome Evaluation: Pt discharged home self care. Discharge information and medications explained to pt. Pt verbalized understaning.

## 2023-01-02 NOTE — OUTREACH NOTE
Prep Survey    Flowsheet Row Responses   [facility] facility patient discharged from? [facility]   Is LACE score < 7 ? No   Eligibility Readm Mgmt   Discharge diagnosis Sepsis   Does the patient have one of the following disease processes/diagnoses(primary or secondary)? Sepsis   Does the patient have Home health ordered? No   Is there a DME ordered? No   Prep survey completed? Yes          EDWIGE SCOTT - Registered Nurse

## 2023-01-05 ENCOUNTER — READMISSION MANAGEMENT (OUTPATIENT)
Dept: CALL CENTER | Facility: HOSPITAL | Age: 33
End: 2023-01-05
Payer: MEDICARE

## 2023-01-05 NOTE — OUTREACH NOTE
Sepsis Week 1 Survey    Flowsheet Row Responses   [facility] patient discharged from? [facility]   Does the patient have one of the following disease processes/diagnoses(primary or secondary)? Sepsis   Week 1 attempt successful? Yes   Call start time 0925   Call end time 0929   Discharge diagnosis Sepsis   Person spoke with today (if not patient) and relationship patient   Meds reviewed with patient/caregiver? Yes   Is the patient having any side effects they believe may be caused by any medication additions or changes? No   Does the patient have all medications related to this admission filled (includes all antibiotics, inhalers, nebulizers,steroids,etc.) Yes   Is the patient taking all medications as directed (includes completed medication regime)? Yes   Does the patient have a primary care provider?  Yes   Does the patient have an appointment with their PCP within 7 days of discharge? No   Nursing Interventions Educated patient on importance of making appointment, Advised patient to make appointment   Has the patient kept scheduled appointments due by today? N/A   Psychosocial issues? No   Did the patient receive a copy of their discharge instructions? Yes   Nursing interventions Reviewed instructions with patient   What is the patient's perception of their health status since discharge? Improving   Nursing interventions Nurse provided patient education   Is the patient/caregiver able to teach back TIME? E xtremely Ill - severe pain, discomfort, shortness of breath, M ental Decline - confused, sleepy, difficult to arouse, I nfection - may have signs and symptoms of an infection, T emperature - higher or lower than normal   Nursing interventions Nurse provided patient education   Is patient/caregiver able to teach back steps to recovery at home? Rest and regain strength, Eat a balanced diet, Learn about sepsis(sepsis.org)   Is the patient/caregiver able to teach back signs and symptoms of worsening condition:  Fever, Shortness of breath/rapid respiratory rate, Altered mental status(confusion/coma)   If the patient is a current smoker, are they able to teach back resources for cessation? Not a smoker   Is the patient/caregiver able to teach back the hierarchy of who to call/visit for symptoms/problems? PCP, Specialist, Home health nurse, Urgent Care, ED, 911 Yes   Week 1 call completed? Yes   Wrap up additional comments Pt states he is getting better. Reviewed s/s of sepsis with patient, and when to call 911. Reviewed AVS/medication with pt. Pt advised to make a PCP fu appt.          EDGAR SCOTT - Registered Nurse

## 2023-01-10 NOTE — PROGRESS NOTES
"Enter Query Response Below      Query Response:     No    Electronically signed by Ramón Murray MD, 01/10/23, 4:10 PM EST.       If applicable, please update the problem list.       Patient: Ghassan Browning        : 1990  Account: [id number]           Admit Date: 2022        How to Respond to this query:       a. Click New Note     b. Answer query within the yellow box.                c. Update the Problem List, if applicable.      If you have any questions about this query contact me at: [email or web address]      Dr. Murray:    Patient admitted with Sepsis, Covid, and Pneumonia.  History of ESRD/dialysis.  BMI noted at 19.  Severe protein calorie malnutrition is documented in the progress notes and discharge diagnoses. Nutrition services saw the patient on , ,  and their notes indicate \"RD assessment triggered for low BMI in setting on ESRD on HD and previous dx PCM.\"  Patient was ordered supplements three times a day.  No malnutrition severity assessment was done during the admission. Progress notes do indicate peripheral muscle wasting.     Aspen criteria for malnutrition require the presence of at least two of the following: Insufficient energy intake, Weight loss, Loss of muscle mass, Loss of subcutaneous fat, Localized or generalized fluid accumulation, Diminished functional status (measured by calibrated hand  strength).        After study, was Severe malnutrition clinically supported during this admission?    Yes, please include additional clinical indicators:____________  No  Other- specify________  Unable to determine     By submitting this query, we are merely seeking further clarification of documentation to accurately reflect all conditions that you are monitoring, evaluating, treating or that extend the hospitalization or utilize additional resources of care. Please utilize your independent clinical judgment when addressing the question(s) above.     This query " and your response, once completed, will be entered into the legal medical record.    Sincerely,  Marsha Tavares RN CCDS  Clinical Documentation Integrity Program

## 2023-01-12 ENCOUNTER — READMISSION MANAGEMENT (OUTPATIENT)
Dept: CALL CENTER | Facility: HOSPITAL | Age: 33
End: 2023-01-12
Payer: MEDICARE

## 2023-01-12 NOTE — OUTREACH NOTE
Sepsis Week 2 Survey    Flowsheet Row Responses   [facility] patient discharged from? [facility]   Does the patient have one of the following disease processes/diagnoses(primary or secondary)? Sepsis   Week 2 attempt successful? Yes   Call start time 0922   Call end time 0924   Discharge diagnosis Sepsis   Is patient permission given to speak with other caregiver? Yes   Person spoke with today (if not patient) and relationship Mother   Meds reviewed with patient/caregiver? Yes   Is the patient having any side effects they believe may be caused by any medication additions or changes? No   Does the patient have all medications related to this admission filled (includes all antibiotics, inhalers, nebulizers,steroids,etc.) Yes   Is the patient taking all medications as directed (includes completed medication regime)? Yes   Does the patient have a primary care provider?  Yes   Does the patient have an appointment with their PCP within 7 days of discharge? Yes   Has the patient kept scheduled appointments due by today? N/A   Has home health visited the patient within 72 hours of discharge? N/A   Comments She says he is out of town and trying to get back.   Week 2 call completed? Yes   Wrap up additional comments She says the ABX are working well for him.          WILBERTO FLORENTINO - Registered Nurse

## 2023-01-20 ENCOUNTER — READMISSION MANAGEMENT (OUTPATIENT)
Dept: CALL CENTER | Facility: HOSPITAL | Age: 33
End: 2023-01-20
Payer: MEDICARE

## 2023-01-20 NOTE — OUTREACH NOTE
Sepsis Week 3 Survey    Flowsheet Row Responses   [facility] patient discharged from? [facility]   Does the patient have one of the following disease processes/diagnoses(primary or secondary)? Sepsis   Week 3 attempt successful? No   Unsuccessful attempts Attempt 1  [All numbers attempted-no answer]          JOB H - Registered Nurse

## 2023-01-23 ENCOUNTER — READMISSION MANAGEMENT (OUTPATIENT)
Dept: CALL CENTER | Facility: HOSPITAL | Age: 33
End: 2023-01-23
Payer: MEDICARE

## 2023-01-23 NOTE — OUTREACH NOTE
Sepsis Week 3 Survey    Flowsheet Row Responses   [facility] patient discharged from? [facility]   Does the patient have one of the following disease processes/diagnoses(primary or secondary)? Sepsis   Week 3 attempt successful? Yes   Call start time 1615   Call end time 1617   Discharge diagnosis Sepsis   Meds reviewed with patient/caregiver? Yes   Is the patient having any side effects they believe may be caused by any medication additions or changes? No   Is the patient taking all medications as directed (includes completed medication regime)? Yes   Does the patient have a primary care provider?  Yes   Has the patient kept scheduled appointments due by today? N/A   Has home health visited the patient within 72 hours of discharge? N/A   Psychosocial issues? No   What is the patient's perception of their health status since discharge? Improving   Nursing interventions Nurse provided reassurance to patient   Is patient/caregiver able to teach back steps to recovery at home? Set small, achievable goals for return to baseline health, Rest and regain strength   Is the patient/caregiver able to teach back signs and symptoms of worsening condition: Shortness of breath/rapid respiratory rate, Fever   Is the patient/caregiver able to teach back the hierarchy of who to call/visit for symptoms/problems? PCP, Specialist, Home health nurse, Urgent Care, ED, 911 Yes   Week 3 call completed? Yes          URIEL SCOTT - Registered Nurse